# Patient Record
Sex: MALE | Race: ASIAN | NOT HISPANIC OR LATINO
[De-identification: names, ages, dates, MRNs, and addresses within clinical notes are randomized per-mention and may not be internally consistent; named-entity substitution may affect disease eponyms.]

---

## 2017-07-07 ENCOUNTER — APPOINTMENT (OUTPATIENT)
Dept: UROLOGY | Facility: CLINIC | Age: 57
End: 2017-07-07

## 2017-07-07 VITALS — WEIGHT: 170 LBS | HEIGHT: 62 IN | BODY MASS INDEX: 31.28 KG/M2

## 2017-07-07 DIAGNOSIS — F17.200 NICOTINE DEPENDENCE, UNSPECIFIED, UNCOMPLICATED: ICD-10-CM

## 2017-07-07 DIAGNOSIS — E11.9 TYPE 2 DIABETES MELLITUS W/OUT COMPLICATIONS: ICD-10-CM

## 2017-07-07 DIAGNOSIS — I10 ESSENTIAL (PRIMARY) HYPERTENSION: ICD-10-CM

## 2017-07-07 DIAGNOSIS — C61 MALIGNANT NEOPLASM OF PROSTATE: ICD-10-CM

## 2017-07-10 PROBLEM — F17.200 CURRENT SMOKER: Status: ACTIVE | Noted: 2017-07-07

## 2017-07-10 RX ORDER — IBUPROFEN 800 MG/1
800 TABLET, FILM COATED ORAL
Refills: 0 | Status: ACTIVE | COMMUNITY

## 2017-07-10 RX ORDER — FENOFIBRATE 200 MG/1
200 CAPSULE ORAL
Refills: 0 | Status: ACTIVE | COMMUNITY

## 2017-07-10 RX ORDER — HALOBETASOL PROPIONATE 0.5 MG/G
OINTMENT TOPICAL
Refills: 0 | Status: COMPLETED | COMMUNITY

## 2017-07-10 RX ORDER — ATORVASTATIN CALCIUM 10 MG/1
10 TABLET, FILM COATED ORAL
Refills: 0 | Status: ACTIVE | COMMUNITY

## 2017-07-10 RX ORDER — GLIMEPIRIDE 4 MG/1
4 TABLET ORAL
Refills: 0 | Status: ACTIVE | COMMUNITY

## 2017-07-10 RX ORDER — ENALAPRIL MALEATE 10 MG/1
10 TABLET ORAL
Refills: 0 | Status: ACTIVE | COMMUNITY

## 2017-07-10 RX ORDER — PANTOPRAZOLE SODIUM 40 MG/10ML
40 INJECTION, POWDER, FOR SOLUTION INTRAVENOUS
Refills: 0 | Status: ACTIVE | COMMUNITY

## 2017-07-10 RX ORDER — METFORMIN HYDROCHLORIDE 1000 MG/1
TABLET, FILM COATED ORAL
Refills: 0 | Status: ACTIVE | COMMUNITY

## 2019-12-08 ENCOUNTER — INPATIENT (INPATIENT)
Facility: HOSPITAL | Age: 59
LOS: 7 days | Discharge: HOME | End: 2019-12-16
Attending: HOSPITALIST | Admitting: HOSPITALIST
Payer: MEDICAID

## 2019-12-08 VITALS — OXYGEN SATURATION: 100 % | HEART RATE: 76 BPM

## 2019-12-08 DIAGNOSIS — R41.82 ALTERED MENTAL STATUS, UNSPECIFIED: ICD-10-CM

## 2019-12-08 LAB
ALBUMIN SERPL ELPH-MCNC: 4.4 G/DL — SIGNIFICANT CHANGE UP (ref 3.3–5)
ALP SERPL-CCNC: 84 U/L — SIGNIFICANT CHANGE UP (ref 40–120)
ALT FLD-CCNC: 51 U/L — HIGH (ref 4–41)
AMPHET UR-MCNC: NEGATIVE — SIGNIFICANT CHANGE UP
ANION GAP SERPL CALC-SCNC: 17 MMO/L — HIGH (ref 7–14)
APAP SERPL-MCNC: < 15 UG/ML — LOW (ref 15–25)
APPEARANCE UR: CLEAR — SIGNIFICANT CHANGE UP
APTT BLD: 29.6 SEC — SIGNIFICANT CHANGE UP (ref 27.5–36.3)
AST SERPL-CCNC: 32 U/L — SIGNIFICANT CHANGE UP (ref 4–40)
BACTERIA # UR AUTO: NEGATIVE — SIGNIFICANT CHANGE UP
BARBITURATES UR SCN-MCNC: NEGATIVE — SIGNIFICANT CHANGE UP
BASE EXCESS BLDA CALC-SCNC: -6.1 MMOL/L — SIGNIFICANT CHANGE UP
BASE EXCESS BLDV CALC-SCNC: -1.3 MMOL/L — SIGNIFICANT CHANGE UP
BASOPHILS # BLD AUTO: 0.06 K/UL — SIGNIFICANT CHANGE UP (ref 0–0.2)
BASOPHILS NFR BLD AUTO: 0.7 % — SIGNIFICANT CHANGE UP (ref 0–2)
BENZODIAZ UR-MCNC: NEGATIVE — SIGNIFICANT CHANGE UP
BILIRUB SERPL-MCNC: < 0.2 MG/DL — LOW (ref 0.2–1.2)
BILIRUB UR-MCNC: NEGATIVE — SIGNIFICANT CHANGE UP
BLOOD GAS ARTERIAL - FIO2: 50 — SIGNIFICANT CHANGE UP
BLOOD GAS VENOUS - CREATININE: 0.82 MG/DL — SIGNIFICANT CHANGE UP (ref 0.5–1.3)
BLOOD UR QL VISUAL: NEGATIVE — SIGNIFICANT CHANGE UP
BUN SERPL-MCNC: 12 MG/DL — SIGNIFICANT CHANGE UP (ref 7–23)
CALCIUM SERPL-MCNC: 8.9 MG/DL — SIGNIFICANT CHANGE UP (ref 8.4–10.5)
CANNABINOIDS UR-MCNC: NEGATIVE — SIGNIFICANT CHANGE UP
CHLORIDE BLDA-SCNC: 109 MMOL/L — HIGH (ref 96–108)
CHLORIDE BLDV-SCNC: 107 MMOL/L — SIGNIFICANT CHANGE UP (ref 96–108)
CHLORIDE SERPL-SCNC: 101 MMOL/L — SIGNIFICANT CHANGE UP (ref 98–107)
CO2 SERPL-SCNC: 21 MMOL/L — LOW (ref 22–31)
COCAINE METAB.OTHER UR-MCNC: NEGATIVE — SIGNIFICANT CHANGE UP
COLOR SPEC: SIGNIFICANT CHANGE UP
CREAT SERPL-MCNC: 0.77 MG/DL — SIGNIFICANT CHANGE UP (ref 0.5–1.3)
EOSINOPHIL # BLD AUTO: 0.27 K/UL — SIGNIFICANT CHANGE UP (ref 0–0.5)
EOSINOPHIL NFR BLD AUTO: 3.2 % — SIGNIFICANT CHANGE UP (ref 0–6)
ETHANOL BLD-MCNC: 246 MG/DL — HIGH
GAS PNL BLDV: 141 MMOL/L — SIGNIFICANT CHANGE UP (ref 136–146)
GLUCOSE BLDA-MCNC: 198 MG/DL — HIGH (ref 70–99)
GLUCOSE BLDV-MCNC: 86 MG/DL — SIGNIFICANT CHANGE UP (ref 70–99)
GLUCOSE SERPL-MCNC: 91 MG/DL — SIGNIFICANT CHANGE UP (ref 70–99)
GLUCOSE UR-MCNC: NEGATIVE — SIGNIFICANT CHANGE UP
HCO3 BLDA-SCNC: 19 MMOL/L — LOW (ref 22–26)
HCO3 BLDV-SCNC: 21 MMOL/L — SIGNIFICANT CHANGE UP (ref 20–27)
HCT VFR BLD CALC: 40.9 % — SIGNIFICANT CHANGE UP (ref 39–50)
HCT VFR BLDA CALC: 38.9 % — LOW (ref 39–51)
HCT VFR BLDV CALC: 40.7 % — SIGNIFICANT CHANGE UP (ref 39–51)
HGB BLD-MCNC: 13.1 G/DL — SIGNIFICANT CHANGE UP (ref 13–17)
HGB BLDA-MCNC: 12.6 G/DL — LOW (ref 13–17)
HGB BLDV-MCNC: 13.2 G/DL — SIGNIFICANT CHANGE UP (ref 13–17)
HYALINE CASTS # UR AUTO: NEGATIVE — SIGNIFICANT CHANGE UP
IMM GRANULOCYTES NFR BLD AUTO: 0.2 % — SIGNIFICANT CHANGE UP (ref 0–1.5)
INR BLD: 0.9 — SIGNIFICANT CHANGE UP (ref 0.88–1.17)
KETONES UR-MCNC: SIGNIFICANT CHANGE UP
LACTATE BLDA-SCNC: 5.1 MMOL/L — CRITICAL HIGH (ref 0.5–2)
LACTATE BLDV-MCNC: 3.9 MMOL/L — HIGH (ref 0.5–2)
LEUKOCYTE ESTERASE UR-ACNC: NEGATIVE — SIGNIFICANT CHANGE UP
LYMPHOCYTES # BLD AUTO: 4.5 K/UL — HIGH (ref 1–3.3)
LYMPHOCYTES # BLD AUTO: 53.4 % — HIGH (ref 13–44)
MCHC RBC-ENTMCNC: 26.4 PG — LOW (ref 27–34)
MCHC RBC-ENTMCNC: 32 % — SIGNIFICANT CHANGE UP (ref 32–36)
MCV RBC AUTO: 82.5 FL — SIGNIFICANT CHANGE UP (ref 80–100)
METHADONE UR-MCNC: NEGATIVE — SIGNIFICANT CHANGE UP
MONOCYTES # BLD AUTO: 0.77 K/UL — SIGNIFICANT CHANGE UP (ref 0–0.9)
MONOCYTES NFR BLD AUTO: 9.1 % — SIGNIFICANT CHANGE UP (ref 2–14)
NEUTROPHILS # BLD AUTO: 2.8 K/UL — SIGNIFICANT CHANGE UP (ref 1.8–7.4)
NEUTROPHILS NFR BLD AUTO: 33.4 % — LOW (ref 43–77)
NITRITE UR-MCNC: NEGATIVE — SIGNIFICANT CHANGE UP
NRBC # FLD: 0 K/UL — SIGNIFICANT CHANGE UP (ref 0–0)
OPIATES UR-MCNC: NEGATIVE — SIGNIFICANT CHANGE UP
OXYCODONE UR-MCNC: NEGATIVE — SIGNIFICANT CHANGE UP
PCO2 BLDA: 46 MMHG — SIGNIFICANT CHANGE UP (ref 35–48)
PCO2 BLDV: 49 MMHG — SIGNIFICANT CHANGE UP (ref 41–51)
PCP UR-MCNC: NEGATIVE — SIGNIFICANT CHANGE UP
PH BLDA: 7.26 PH — LOW (ref 7.35–7.45)
PH BLDV: 7.31 PH — LOW (ref 7.32–7.43)
PH UR: 6 — SIGNIFICANT CHANGE UP (ref 5–8)
PLATELET # BLD AUTO: 215 K/UL — SIGNIFICANT CHANGE UP (ref 150–400)
PMV BLD: 11 FL — SIGNIFICANT CHANGE UP (ref 7–13)
PO2 BLDA: 203 MMHG — HIGH (ref 83–108)
PO2 BLDV: 27 MMHG — LOW (ref 35–40)
POTASSIUM BLDA-SCNC: 3.4 MMOL/L — SIGNIFICANT CHANGE UP (ref 3.4–4.5)
POTASSIUM BLDV-SCNC: 3 MMOL/L — LOW (ref 3.4–4.5)
POTASSIUM SERPL-MCNC: 3.5 MMOL/L — SIGNIFICANT CHANGE UP (ref 3.5–5.3)
POTASSIUM SERPL-SCNC: 3.5 MMOL/L — SIGNIFICANT CHANGE UP (ref 3.5–5.3)
PROT SERPL-MCNC: 7.5 G/DL — SIGNIFICANT CHANGE UP (ref 6–8.3)
PROT UR-MCNC: 200 — HIGH
PROTHROM AB SERPL-ACNC: 10 SEC — SIGNIFICANT CHANGE UP (ref 9.8–13.1)
RBC # BLD: 4.96 M/UL — SIGNIFICANT CHANGE UP (ref 4.2–5.8)
RBC # FLD: 12.7 % — SIGNIFICANT CHANGE UP (ref 10.3–14.5)
RBC CASTS # UR COMP ASSIST: SIGNIFICANT CHANGE UP (ref 0–?)
SALICYLATES SERPL-MCNC: < 5 MG/DL — LOW (ref 15–30)
SAO2 % BLDA: 99.1 % — HIGH (ref 95–99)
SAO2 % BLDV: 38.2 % — LOW (ref 60–85)
SODIUM BLDA-SCNC: 138 MMOL/L — SIGNIFICANT CHANGE UP (ref 136–146)
SODIUM SERPL-SCNC: 139 MMOL/L — SIGNIFICANT CHANGE UP (ref 135–145)
SP GR SPEC: 1.02 — SIGNIFICANT CHANGE UP (ref 1–1.04)
SQUAMOUS # UR AUTO: SIGNIFICANT CHANGE UP
TROPONIN T, HIGH SENSITIVITY: 8 NG/L — SIGNIFICANT CHANGE UP (ref ?–14)
UROBILINOGEN FLD QL: NORMAL — SIGNIFICANT CHANGE UP
WBC # BLD: 8.42 K/UL — SIGNIFICANT CHANGE UP (ref 3.8–10.5)
WBC # FLD AUTO: 8.42 K/UL — SIGNIFICANT CHANGE UP (ref 3.8–10.5)
WBC UR QL: SIGNIFICANT CHANGE UP (ref 0–?)

## 2019-12-08 PROCEDURE — 70450 CT HEAD/BRAIN W/O DYE: CPT | Mod: 26

## 2019-12-08 PROCEDURE — 99291 CRITICAL CARE FIRST HOUR: CPT | Mod: 25

## 2019-12-08 PROCEDURE — 76604 US EXAM CHEST: CPT | Mod: 26,GC

## 2019-12-08 PROCEDURE — 36600 WITHDRAWAL OF ARTERIAL BLOOD: CPT

## 2019-12-08 PROCEDURE — 71045 X-RAY EXAM CHEST 1 VIEW: CPT | Mod: 26

## 2019-12-08 PROCEDURE — 76937 US GUIDE VASCULAR ACCESS: CPT | Mod: 26

## 2019-12-08 PROCEDURE — 93308 TTE F-UP OR LMTD: CPT | Mod: 26,GC

## 2019-12-08 RX ORDER — THIAMINE MONONITRATE (VIT B1) 100 MG
500 TABLET ORAL THREE TIMES A DAY
Refills: 0 | Status: DISCONTINUED | OUTPATIENT
Start: 2019-12-08 | End: 2019-12-08

## 2019-12-08 RX ORDER — SUCCINYLCHOLINE CHLORIDE 100 MG/5ML
100 SYRINGE (ML) INTRAVENOUS ONCE
Refills: 0 | Status: COMPLETED | OUTPATIENT
Start: 2019-12-08 | End: 2019-12-08

## 2019-12-08 RX ORDER — PROPOFOL 10 MG/ML
10 INJECTION, EMULSION INTRAVENOUS
Qty: 1000 | Refills: 0 | Status: DISCONTINUED | OUTPATIENT
Start: 2019-12-08 | End: 2019-12-09

## 2019-12-08 RX ORDER — CHLORHEXIDINE GLUCONATE 213 G/1000ML
15 SOLUTION TOPICAL EVERY 12 HOURS
Refills: 0 | Status: DISCONTINUED | OUTPATIENT
Start: 2019-12-08 | End: 2019-12-10

## 2019-12-08 RX ORDER — THIAMINE MONONITRATE (VIT B1) 100 MG
500 TABLET ORAL THREE TIMES A DAY
Refills: 0 | Status: COMPLETED | OUTPATIENT
Start: 2019-12-08 | End: 2019-12-11

## 2019-12-08 RX ORDER — NALOXONE HYDROCHLORIDE 4 MG/.1ML
0.4 SPRAY NASAL ONCE
Refills: 0 | Status: COMPLETED | OUTPATIENT
Start: 2019-12-08 | End: 2019-12-08

## 2019-12-08 RX ORDER — HEPARIN SODIUM 5000 [USP'U]/ML
5000 INJECTION INTRAVENOUS; SUBCUTANEOUS EVERY 8 HOURS
Refills: 0 | Status: DISCONTINUED | OUTPATIENT
Start: 2019-12-08 | End: 2019-12-16

## 2019-12-08 RX ORDER — CHLORHEXIDINE GLUCONATE 213 G/1000ML
1 SOLUTION TOPICAL
Refills: 0 | Status: DISCONTINUED | OUTPATIENT
Start: 2019-12-08 | End: 2019-12-10

## 2019-12-08 RX ORDER — ETOMIDATE 2 MG/ML
20 INJECTION INTRAVENOUS ONCE
Refills: 0 | Status: COMPLETED | OUTPATIENT
Start: 2019-12-08 | End: 2019-12-08

## 2019-12-08 RX ORDER — NOREPINEPHRINE BITARTRATE/D5W 8 MG/250ML
0.05 PLASTIC BAG, INJECTION (ML) INTRAVENOUS
Qty: 8 | Refills: 0 | Status: DISCONTINUED | OUTPATIENT
Start: 2019-12-08 | End: 2019-12-09

## 2019-12-08 RX ORDER — LEVETIRACETAM 250 MG/1
1000 TABLET, FILM COATED ORAL ONCE
Refills: 0 | Status: COMPLETED | OUTPATIENT
Start: 2019-12-08 | End: 2019-12-08

## 2019-12-08 RX ORDER — SODIUM CHLORIDE 9 MG/ML
500 INJECTION INTRAMUSCULAR; INTRAVENOUS; SUBCUTANEOUS ONCE
Refills: 0 | Status: COMPLETED | OUTPATIENT
Start: 2019-12-08 | End: 2019-12-09

## 2019-12-08 RX ORDER — MIDAZOLAM HYDROCHLORIDE 1 MG/ML
4 INJECTION, SOLUTION INTRAMUSCULAR; INTRAVENOUS ONCE
Refills: 0 | Status: DISCONTINUED | OUTPATIENT
Start: 2019-12-08 | End: 2019-12-08

## 2019-12-08 RX ORDER — SODIUM CHLORIDE 9 MG/ML
1000 INJECTION, SOLUTION INTRAVENOUS
Refills: 0 | Status: DISCONTINUED | OUTPATIENT
Start: 2019-12-08 | End: 2019-12-09

## 2019-12-08 RX ORDER — FENTANYL CITRATE 50 UG/ML
100 INJECTION INTRAVENOUS ONCE
Refills: 0 | Status: DISCONTINUED | OUTPATIENT
Start: 2019-12-08 | End: 2019-12-08

## 2019-12-08 RX ORDER — PANTOPRAZOLE SODIUM 20 MG/1
40 TABLET, DELAYED RELEASE ORAL
Refills: 0 | Status: DISCONTINUED | OUTPATIENT
Start: 2019-12-08 | End: 2019-12-09

## 2019-12-08 RX ORDER — MIDAZOLAM HYDROCHLORIDE 1 MG/ML
0.02 INJECTION, SOLUTION INTRAMUSCULAR; INTRAVENOUS
Qty: 100 | Refills: 0 | Status: DISCONTINUED | OUTPATIENT
Start: 2019-12-08 | End: 2019-12-09

## 2019-12-08 RX ADMIN — PROPOFOL 4.97 MICROGRAM(S)/KG/MIN: 10 INJECTION, EMULSION INTRAVENOUS at 22:37

## 2019-12-08 RX ADMIN — LEVETIRACETAM 400 MILLIGRAM(S): 250 TABLET, FILM COATED ORAL at 18:25

## 2019-12-08 RX ADMIN — MIDAZOLAM HYDROCHLORIDE 4 MILLIGRAM(S): 1 INJECTION, SOLUTION INTRAMUSCULAR; INTRAVENOUS at 21:34

## 2019-12-08 RX ADMIN — FENTANYL CITRATE 100 MICROGRAM(S): 50 INJECTION INTRAVENOUS at 18:00

## 2019-12-08 RX ADMIN — ETOMIDATE 20 MILLIGRAM(S): 2 INJECTION INTRAVENOUS at 17:53

## 2019-12-08 RX ADMIN — HEPARIN SODIUM 5000 UNIT(S): 5000 INJECTION INTRAVENOUS; SUBCUTANEOUS at 22:38

## 2019-12-08 RX ADMIN — Medication 100 MILLIGRAM(S): at 17:53

## 2019-12-08 RX ADMIN — NALOXONE HYDROCHLORIDE 0.4 MILLIGRAM(S): 4 SPRAY NASAL at 17:55

## 2019-12-08 RX ADMIN — PROPOFOL 4.97 MICROGRAM(S)/KG/MIN: 10 INJECTION, EMULSION INTRAVENOUS at 18:35

## 2019-12-08 RX ADMIN — SODIUM CHLORIDE 100 MILLILITER(S): 9 INJECTION, SOLUTION INTRAVENOUS at 21:35

## 2019-12-08 RX ADMIN — MIDAZOLAM HYDROCHLORIDE 1.66 MG/KG/HR: 1 INJECTION, SOLUTION INTRAMUSCULAR; INTRAVENOUS at 21:35

## 2019-12-08 RX ADMIN — Medication 7.24 MICROGRAM(S)/KG/MIN: at 22:37

## 2019-12-08 NOTE — ED ADULT TRIAGE NOTE - CHIEF COMPLAINT QUOTE
brought in by ems as notification for altered mental status. arrives unresponsive. brought directly to tr b.

## 2019-12-08 NOTE — ED PROVIDER NOTE - ATTENDING CONTRIBUTION TO CARE
I performed a face to face bedside interview with patient regarding history of present illness, review of symptoms and past medical history. I completed an independent physical exam.  I have discussed patient's plan of care.   I agree with note as stated above, having amended the EMR as needed to reflect my findings. I have discussed the assessment and plan of care.  This includes during the time I functioned as the attending physician for this patient.  Attending Contribution to Care: agree with plan of resident. pt with ams, plus vomiting. requiring intubation for air way protection. pt ct head neg for ICH. eventual labs who post for etoh. although family denies. pt vss, with no hyper/hypo tension. labs otherwise wnl with no signs of infection. concern for aspiration pna but no signs on cxr. intubation uncomplicated and peg placed uncomplicated. stable for icu admission at this point.

## 2019-12-08 NOTE — H&P ADULT - NSHPLABSRESULTS_GEN_ALL_CORE
OBJECTIVE:  ICU Vital Signs Last 24 Hrs  T(C): --  T(F): --  HR: 74 (08 Dec 2019 19:16) (70 - 76)  BP: 112/69 (08 Dec 2019 19:00) (112/69 - 125/75)  BP(mean): 79 (08 Dec 2019 19:00) (78 - 79)  ABP: --  ABP(mean): --  RR: 17 (08 Dec 2019 19:00) (16 - 17)  SpO2: 100% (08 Dec 2019 19:16) (100% - 100%)    Mode: AC/ CMV (Assist Control/ Continuous Mandatory Ventilation), RR (machine): 16, TV (machine): 350, FiO2: 50, PEEP: 5, MAP: 10, PIP: 22    CAPILLARY BLOOD GLUCOSE    LABS:                        13.1   8.42  )-----------( 215      ( 08 Dec 2019 17:45 )             40.9         139  |  101  |  12  ----------------------------<  91  3.5   |  21<L>  |  0.77    Ca    8.9      08 Dec 2019 17:45    TPro  7.5  /  Alb  4.4  /  TBili  < 0.2<L>  /  DBili  x   /  AST  32  /  ALT  51<H>  /  AlkPhos  84  1208    PT/INR - ( 08 Dec 2019 17:45 )   PT: 10.0 SEC;   INR: 0.90          PTT - ( 08 Dec 2019 17:45 )  PTT:29.6 SEC  Urinalysis Basic - ( 08 Dec 2019 18:51 )    Color: LIGHT YELLOW / Appearance: CLEAR / S.016 / pH: 6.0  Gluc: NEGATIVE / Ketone: SMALL  / Bili: NEGATIVE / Urobili: NORMAL   Blood: NEGATIVE / Protein: 200 / Nitrite: NEGATIVE   Leuk Esterase: NEGATIVE / RBC: 3-5 / WBC 0-2   Sq Epi: OCC / Non Sq Epi: x / Bacteria: NEGATIVE        Venous Blood Gas:   @ 17:45  7.31/49/27/21/38.2  VBG Lactate: 3.9      MICROBIOLOGY:     RADIOLOGY:  < from: CT Brain Stroke Protocol (19 @ 17:56) >      EXAM:  CT BRAIN STROKE PROTOCOL        PROCEDURE DATE:  Dec  8 2019         INTERPRETATION:  HISTORY: Unresponsive    COMPARISON: None    TECHNIQUE: Axial noncontrast CT images from the skull base to the vertex   were obtained and submitted for interpretation. Coronal and sagittal   reformatted images were performed. Bone and soft tissue windows were   evaluated.    FINDINGS: There is no acute intracranial mass-effect, hemorrhage, midline   shift, or abnormal extra-axial fluid collection.   Basal cisterns are patent.     The ventricles, and sulci are normal in size for the patient's age.     Paranasal sinuses and mastoid air cells are clear. Calvarium is intact.     IMPRESSION: No acute intracranial hemorrhage, mass effect, or shift of   the midline structures.     < end of copied text >        [ ] Reviewed and interpreted by me    EKG:

## 2019-12-08 NOTE — H&P ADULT - NSHPPHYSICALEXAM_GEN_ALL_CORE
PHYSICAL EXAM:  General:   HEENT:   Lymph Nodes:  Neck:   Respiratory:   Cardiovascular:   Abdomen:   Extremities:   Skin:   Neurological:  Psychiatry: PHYSICAL EXAM:  General: patient intubated, not disheveled   HEENT: pupils reactive to light, no scleral icterus  Lymph Nodes: no tender lymph notes  Neck: supple  Respiratory: lungs clear to auscultation, non labored breathing  Cardiovascular: normal s1, s2, no murmurs, gallops  Abdomen: soft, non-tender, non-distended, bowel sounds normoactive  Extremities: no lower extremity swelling  Skin: no rashes  Neurological: A&Ox0, on propfol

## 2019-12-08 NOTE — H&P ADULT - ATTENDING COMMENTS
60 yo with DM and HTN presenting after being found unresponsive and having poss seizure activity.  In ED he was found to have blood alcohol level of 246 and had witnessed seizure.  CT head negative,  Was intubated for airway protection after vomiting. Pt loaded with Keppra in ED; sedated on vent with prop and midazolam    POCUS shows large liver, nl LVF, RV<LV size, no pericardial effusion, lungs are A line predom with poss atalectasis vs small consolidations at bilat bases; no pleff.  Kidneys no hydro but L kidney has two large cysts  Afeb  BP soft   WBC 8  Plt 215  INR .9  creat .77  LFT wnl (ALT 51)    Acute EtOH intoxication and seizure activity  Poss aspiration event; intubated for airway protection  - continue sedation; may lighten during vEEG  - vEEG in am  - hold ABX and monitor  - MVI, thiamine, folate  - consider MRI  - check ammonia level and hep screen  - neuro note appreciated    guarded  cc time 40 min excluding time spent on POCUS

## 2019-12-08 NOTE — CONSULT NOTE ADULT - ASSESSMENT
59Y M with PMH DM, HTN, HLD who presents with acute onset of unresponsiveness. CT Head negative for acute pathology. Exam significant for + movement in all extremities although no withdrawl/grimace to noxious stimuli, Left upwards gaze deviated and rigid RUE. Limited exam due to sedation.    Impression:  Status epilepticus likely secondary to alcohol withdrawl     Recommend:  - 59Y M with PMH DM, HTN, HLD who presents with acute onset of unresponsiveness. CT Head negative for acute pathology. Exam significant for + movement in all extremities although no withdrawl/grimace to noxious stimuli, Left upwards gaze deviated and rigid RUE. Limited exam due to sedation. S/p 1g Keppra load in ED. Currently sedated on propofol     Impression:  NCSE likely secondary to severe alcohol intoxication     Recommend:  - pursue metabolic/infectious workup to rule out contributing factors for seizures  - vEEG  - send ammonia level  - MRI brain w/ and w/o to evaluate for structural abnormality that can pre-dispose to seizure susceptibility   - when titrating off propofol for extubation, can use Ativan 2mg PRN as needed for seizure activity

## 2019-12-08 NOTE — CHART NOTE - NSCHARTNOTEFT_GEN_A_CORE
Critically ill patient requiring ABG to determine respiratory status.  This was an emergent procedure.  Patients radial artery was palpated/ visualized with US and cleaned with alcohol pad.   23g x 3/4" x 12" butterfly needed was inserted into the left/right radial artery with pulsatile flash.  One attempt was performed.  3cc of blood was obtained from patient.  Gauze pad was placed over site, needle withdrawn and firm pressure was held until complete hemostasis was achieved and band-aid was applied.  Patient tolerated procedure well with no complications.        [facility] RPA [id number]

## 2019-12-08 NOTE — H&P ADULT - HISTORY OF PRESENT ILLNESS
CHIEF COMPLAINT: AMS    HPI  Patient is a 59Y M with PMH DM, HTN, HLD who presents with acute onset of altered mental status. Per niece at bedside, she was upstairs and heard a thump (unclear if patient fell or if he dropped something). Niece heard her grandmother, who was in the kitchen at the time, call out for her so she went to evaluate her uncle who was in the middle of eating dinner. Patient told his niece that he was not hungry anymore and he did not feel so good. He stated he was seeing something at the top of his eyes and then slumped over at the table. Was not arousable per family, though niece states he was squeezing her hand en route in EMS. Code stroke called on arrival, initial NIHSS: 28, pre-MRS: unknown.     CT Head negative for hemorrhage or acute infarct. While pending CTA, patient vomited and was subsequently intubated for airway protection. He was then noted to be moving all extremities and had a left upwards gaze deviation. Shortly after, was noticed to have adducted, contracted and rigid RUE as well as episode of urinary incontinence. No prior hx of seizures. In the ED, patient's tylenol and salicyclate level was wnl, Ethanol level was elevated at 246. electrolytes are wnl. Started on propofol for sedation.    Niece endorses hx of focal seizures but is unsure of rest of family hx. Patients mother unable to be reached at time of exam.        FAMILY HISTORY:      SOCIAL HISTORY:  Smoking: __ packs x ___ years  EtOH Use:  Marital Status:  Occupation:  Recent Travel:  Country of Birth:  Advance Directives:    Allergies    No Known Allergies    Intolerances        HOME MEDICATIONS:    HOSPITAL MEDICATIONS:  MEDICATIONS  (STANDING):  chlorhexidine 0.12% Liquid 15 milliLiter(s) Oral Mucosa every 12 hours  propofol Infusion 10 MICROgram(s)/kG/Min (4.968 mL/Hr) IV Continuous <Continuous>    MEDICATIONS  (PRN): CHIEF COMPLAINT: AMS    HPI  Patient is a 59Y M with PMH DM, HTN, HLD who presents with acute onset of altered mental status. Per niece at bedside, she was upstairs and heard a thump (unclear if patient fell or if he dropped something). Niece heard her grandmother, who was in the kitchen at the time, call out for her so she went to evaluate her uncle who was in the middle of eating dinner. Patient told his niece that he was not hungry anymore and he did not feel so good. He stated he was seeing something at the top of his eyes and then slumped over at the table. Was not arousable per family, though niece states he was squeezing her hand en route in EMS. Code stroke called on arrival.    CT Head negative for hemorrhage or acute infarct. While pending CTA, patient vomited and was subsequently intubated for airway protection. He was then noted to be moving all extremities and had a left upwards gaze deviation. Shortly after, was noticed to have adducted, contracted and rigid RUE as well as episode of urinary incontinence. No prior hx of seizures. In the ED, patient's tylenol and salicyclate level was wnl, Ethanol level was elevated at 246. electrolytes are wnl. Started on propofol for sedation.    Niece endorses hx of focal seizures but is unsure of rest of family hx. Patients mother unable to be reached at time of exam.        FAMILY HISTORY:    Allergies    No Known Allergies    Intolerances    HOME MEDICATIONS:    HOSPITAL MEDICATIONS:  MEDICATIONS  (STANDING):  chlorhexidine 0.12% Liquid 15 milliLiter(s) Oral Mucosa every 12 hours  propofol Infusion 10 MICROgram(s)/kG/Min (4.968 mL/Hr) IV Continuous <Continuous>    MEDICATIONS  (PRN):

## 2019-12-08 NOTE — H&P ADULT - NSHPSOCIALHISTORY_GEN_ALL_CORE
SOCIAL HISTORY:  Smoking: <1 PPY x >40 yearsyears  EtOH Use: "occassional" alcohol use, never has had any prior intubations, intoxication episodes  Marital Status: no wife  Occupation:  Recent Travel: none  Advance Directives: full code

## 2019-12-08 NOTE — ED ADULT NURSE NOTE - OBJECTIVE STATEMENT
received to tr B as notification for AMS. as per family pt was eating, then said he saw a light and then put his head down and became unresponsive. pt was unresponsive on ems arrival. on arrival to ed was responsive to verbal stimuli. code stroke called. brought directly to ct scan. had episode of vomiting at ct scan. brought back to room B. intubated by MD. 7.5 et tube placed, 25@lip line. pt noted to have urinated on self. started on propofol as per md order. endorsed to WILLIE Castro.

## 2019-12-08 NOTE — PROCEDURE NOTE - NSPROCDETAILS_GEN_ALL_CORE
blood seen on insertion/sterile technique, catheter placed/ultrasound utilization/dressing applied/flushes easily/secured in place/location identified, draped/prepped, sterile technique used

## 2019-12-08 NOTE — H&P ADULT - ASSESSMENT
59Y M with PMH DM, HTN, HLD who presents with acute onset of unresponsiveness. CT Head negative for acute pathology, likely has Status epilepticus likely secondary to alcohol withdrawal    #Neuro  Status epilepticus 2/2 to etoh withdrawal  -ethanol level in blood 246  -start Ativan taper    #Resp  Intubated for airway protection  -f/u post extubation ABG  -afebrile, without a white count  -f/u Xray to assess for aspiration PNA, and consider starting antibiotics     #CV  HTN      #GI  -NPO for aspiration risk    #ID  -afebrile and WBC 8.42  -UA negative for leuk esterase and nitrates  -f/u CXR  -no need for abx now    #Renal  -Creatinine 0.77 (no baseline)  -Has rossi because patient was incontinent during the episode  -monitor Is and Os    #Heme  -Hemoglobin 13.1, stable  -continue to monitor    #Endo  DM  -HbA1c in AM    #DVT PPx  -Heparin 5000 TID 59Y M with PMH DM, HTN, HLD who presents with acute onset of unresponsiveness. CT Head negative for acute pathology, likely has Status epilepticus likely secondary to alcohol intoxication    #Neuro  Alcoholic intoxication vs new onset seizures  -ethanol level in blood 246  -start banana bag   -taper propofol and get EEG for seizures  -f/u neuro recs  -CT head negative     #Resp  Intubated for airway protection  -f/u post extubation ABG  -afebrile, without a white count  -f/u Xray    #CV  HTN  -on home metoprolol (med rec to be done in the AM)  -MAP <65 when propofol at 40, MAP> 65 with lower propofol levels.   -weaning propfol as tolerated    #GI  -NPO for now  -protonix 40 IV BID    #ID  -afebrile and WBC 8.42  -UA negative for leuk esterase and nitrates  -f/u CXR  -no need for abx now    #Renal  -Creatinine 0.77 (no baseline)  -Has rossi because patient was incontinent during the episode  -monitor Is and Os    #Heme  -Hemoglobin 13.1, stable  -continue to monitor  -f/u FOBT    #Endo  DM  -only on metformin  -HbA1c in AM    #DVT PPx  -Heparin 5000 TID 59Y M with PMH DM, HTN, HLD who presents with acute onset of unresponsiveness. CT Head negative for acute pathology, likely has Status epilepticus likely secondary to alcohol intoxication    #Neuro  Alcoholic intoxication vs new onset seizures  -ethanol level in blood 246, utox negative   -start banana bag   -taper propofol and get 24 hour EEG for seizures  -f/u neuro recs  -CT head negative     #Resp  Intubated for airway protection  -f/u post extubation ABG  -afebrile, without a white count  -f/u Xray    #CV  HTN  -on home metoprolol (med rec to be done in the AM)  -MAP <65 when propofol at 40, MAP> 65 with lower propofol levels.   -weaning propfol as tolerated    #GI  -NPO for now  -protonix 40 IV BID    #ID  -afebrile and WBC 8.42  -UA negative for leuk esterase and nitrates  -f/u CXR  -no need for abx now    #Renal  -Creatinine 0.77 (no baseline)  -Has rossi because patient was incontinent during the episode  -monitor Is and Os    #Heme  -Hemoglobin 13.1, stable  -continue to monitor  -f/u FOBT    #Endo  DM  -only on metformin  -HbA1c in AM    #DVT PPx  -Heparin 5000 TID 59Y M with PMH DM, HTN, HLD who presents with acute onset of unresponsiveness. CT Head negative for acute pathology, likely has Status epilepticus likely secondary to alcohol intoxication    #Neuro  Alcoholic intoxication vs new onset seizures  -ethanol level in blood 246, utox negative   -start banana bag   -taper propofol and get 24 hour EEG for seizures  -f/u neuro recs  -CT head negative     #Resp  Intubated for airway protection  -f/u post extubation ABG  -afebrile, without a white count  -f/u Xray    #CV  HTN  -on home metoprolol (med rec to be done in the AM)  -MAP <65 when propofol at 40, MAP> 65 with lower propofol levels.   -weaning propfol as tolerated    #GI  -NPO for now  -protonix 40 IV BID    #ID  -afebrile and WBC 8.42  -UA negative for leuk esterase and nitrates  -f/u CXR  -no need for abx now  - low threshold to start abx     #Renal  -Creatinine 0.77 (no baseline)  -Has rossi because patient was incontinent during the episode  -monitor Is and Os    #Heme  -Hemoglobin 13.1, stable  -continue to monitor  -f/u FOBT    #Endo  DM  -only on metformin  -HbA1c in AM    #DVT PPx  -Heparin 5000 TID 59Y M with PMH DM, HTN, HLD who presents with acute onset of unresponsiveness. CT Head negative for acute pathology, likely has Status epilepticus likely secondary to alcohol intoxication    #Neuro  Alcoholic intoxication vs new onset seizures  -ethanol level in blood 246, utox negative   -start banana bag   -taper propofol and get 24 hour EEG for seizures  -f/u neuro recs  -CT head negative   - F/u MRI brain to r/o structural abnormality    #Resp  Intubated for airway protection  -f/u post extubation ABG  -afebrile, without a white count  -f/u Xray    #CV  HTN  -on home metoprolol (med rec to be done in the AM)  -MAP <65 when propofol at 40, MAP> 65 with lower propofol levels.   -weaning propfol as tolerated    #GI  -NPO for now  -protonix 40 IV BID    #ID  -afebrile and WBC 8.42  -UA negative for leuk esterase and nitrates  -f/u CXR  -no need for abx now  - low threshold to start abx     #Renal  -Creatinine 0.77 (no baseline)  -Has rossi because patient was incontinent during the episode  -monitor Is and Os    #Heme  -Hemoglobin 13.1, stable  -continue to monitor  -f/u FOBT    #Endo  DM  -only on metformin  -HbA1c in AM    #DVT PPx  -Heparin 5000 TID

## 2019-12-08 NOTE — ED PROVIDER NOTE - CLINICAL SUMMARY MEDICAL DECISION MAKING FREE TEXT BOX
59M presenting w altered mental status. Unknown etiology, no reported prior sx. Obtunded, intubated for airway support. Concern for seizure vs intracranial pathology vs metabolic vs other. Labs, CT head, CXR, neuro consult.   Alvaro English MD, PGY3 Emergency Medicine

## 2019-12-08 NOTE — ED PROVIDER NOTE - CRITICAL CARE PROVIDED
interpretation of diagnostic studies/consultation with other physicians/documentation/conducted a detailed discussion of DNR status/additional history taking/direct patient care (not related to procedure)

## 2019-12-08 NOTE — H&P ADULT - NSHPREVIEWOFSYSTEMS_GEN_ALL_CORE
REVIEW OF SYSTEMS:  Constitutional:   Eyes:  ENT:  CV:  Resp:  GI:  :  MSK:  Integumentary:  Neurological:  Psychiatric:  Endocrine:  Hematologic/Lymphatic:  Allergic/Immunologic:  [ ] All other systems negative  [ ] Unable to assess ROS because ________ REVIEW OF SYSTEMS:  Constitutional:   Eyes:  ENT:  CV:  Resp:  GI:  :  MSK:  Integumentary:  Neurological:  Psychiatric:  Endocrine:  Hematologic/Lymphatic:  Allergic/Immunologic:  [ ] All other systems negative  [x] Unable to assess ROS because patient sedated on propofol and intubated

## 2019-12-08 NOTE — ED PROCEDURE NOTE - ATTENDING CONTRIBUTION TO CARE
Directly supervised procedure with no complications. Pt tolerated well.
Directly supervised procedure with no complications. Pt tolerated well.

## 2019-12-08 NOTE — ED PROVIDER NOTE - OBJECTIVE STATEMENT
59M presents via EMS with AMS. per family, patient was eating dinner when he became unresponsive and slumped     Hx HTN, HLD. Meds and allergies unknown. 59M presents via EMS with AMS. per family, patient was eating dinner when he became unresponsive and slumped forward onto his food. No prior reported hx of seizures. Further ROS unknown at this time. On arrival, patient altered, not following commands. Patient straight to CT for stroke code, vomited in CT, was brought to trauma B and intubated for airway support.   Hx HTN, HLD. Meds and allergies unknown.

## 2019-12-08 NOTE — ED PROVIDER NOTE - PHYSICAL EXAMINATION
General: obtunded, not awake, alert, or oriented.   HEENT: PERRLA. No trauma/bruising noted to head or face.  CV: Regular rate and rhythm, S1/S2, no murmurs/rubs/gallops noted on exam.   Lungs: Clear to ascultation bilaterally, no wheezes/crackles/rales noted on exam. Equal chest wall excursion noted.   Abdomen: Soft, non tender, non distended, no guarding or rebound.   MSK: Grossly intact ROM of upper and lower extremities bilaterally. Grossly intact ROM of neck. No gross deformities noted to extremities.   Neuro: AMS. Obtunded, not awake, alert, or oriented. Not responsive to verbal or tactile stimuli.   Extremities: No swelling or edema noted to extremities.   Skin: No rash or bruising noted on exam.

## 2019-12-08 NOTE — CONSULT NOTE ADULT - ATTENDING COMMENTS
Patient seen and examined with neurology resident and above note reviewed and I agree with assessment and plan as outlined. Patient admitted after an unresponsive episode and found being slumped over by family at home. No seizure activity noted and he was found to have elevated ETOH in blood.  Eyes closed but all brain stem reflexes in tact. He does not follow simple commands and reflexes in tact in arms and legs.  He is intubated however plan is to extubate today and obtain MRI brain and routine EEG.  Ammonia level pending.  MICU management and supportive care and behavioral health evaluation.

## 2019-12-08 NOTE — CONSULT NOTE ADULT - SUBJECTIVE AND OBJECTIVE BOX
HPI:  Patient is a 59Y M with PMH DM, HTN, HLD who presents with acute onset of altered mental status. Per niece at bedside, she was upstairs and heard a thump (unclear if patient fell or if he dropped something). Niece heard her grandmother, who was in the kitchen at the time, call out for her so she went to evaluate her uncle who was in the middle of eating dinner. Patient told his niece that he was not hungry anymore and he did not feel so good. He stated he was seeing something at the top of his eyes and then slumped over at the table. Was not arousable per family, though niece states he was squeezing her hand en route in EMS. Code stroke called on arrival, initial NIHSS: 28, pre-MRS: unknown. CT Head negative for hemorrhage or acute infarct. While pending CTA, patient vomited and was subsequently intubated for airway protection. He was then noted to be moving all extremities and had a left upwards gaze deviation. Shortly after, was noticed to have adducted, contracted and rigid RUE as well as episode of urinary incontinence. No convulsions noted on my exam or by family. No prior hx of seizures.   Niece endorses hx of focal seizures but is unsure of rest of family hx. Patients mother unable to be reached at time of exam.          MEDICATIONS  (STANDING):  chlorhexidine 0.12% Liquid 15 milliLiter(s) Oral Mucosa every 12 hours  propofol Infusion 10 MICROgram(s)/kG/Min (4.968 mL/Hr) IV Continuous <Continuous>    MEDICATIONS  (PRN):    PAST MEDICAL & SURGICAL HISTORY:  Osteoarthritis  Diabetes mellitus    FAMILY HISTORY:    Allergies    No Known Allergies    Intolerances        SHx - No smoking, No ETOH, No drug abuse    Review of Systems:  A 10 system ROS was performed and is negative except for those mentioned in HPI      Vital Signs Last 24 Hrs  T(C): --  T(F): --  HR: 74 (08 Dec 2019 18:28) (74 - 76)  BP: 125/75 (08 Dec 2019 18:28) (125/75 - 125/75)  BP(mean): --  RR: 16 (08 Dec 2019 18:28) (16 - 16)  SpO2: 100% (08 Dec 2019 18:28) (100% - 100%)      Neurological (>12): Limited exam prior to intubation, rest of exam obtained after intubated and sedated    MS: Eyes closed, does not respond to verbal or noxious stimuli.    CNs: PERRLA (R = 4mm, L = 4mm). BTT bilaterally, Left and upwards gaze deviation.     Motor: Normal muscle bulk & tone. Rigid RUE, no tremors  Moves all extremities spontaneously, no formal testing due to mental status    Sensation: does not withdraw or grimace to noxious stimuli     Reflexes:              Biceps(C5)       BR(C6)     Triceps(C7)               Patellar(L4)    Achilles(S1)    Plantar Resp  R	2	          2	             2		        2		    2		Down   L	2	          2	             2		        2		    2		Down       12-08    139  |  101  |  12  ----------------------------<  91  3.5   |  21<L>  |  0.77    Ca    8.9      08 Dec 2019 17:45    TPro  7.5  /  Alb  4.4  /  TBili  < 0.2<L>  /  DBili  x   /  AST  32  /  ALT  51<H>  /  AlkPhos  84  12-08 12-08    139  |  101  |  12  ----------------------------<  91  3.5   |  21<L>  |  0.77    Ca    8.9      08 Dec 2019 17:45    TPro  7.5  /  Alb  4.4  /  TBili  < 0.2<L>  /  DBili  x   /  AST  32  /  ALT  51<H>  /  AlkPhos  84  12-08                          13.1   8.42  )-----------( 215      ( 08 Dec 2019 17:45 )             40.9       Radiology

## 2019-12-09 LAB
ALBUMIN SERPL ELPH-MCNC: 3.5 G/DL — SIGNIFICANT CHANGE UP (ref 3.3–5)
ALP SERPL-CCNC: 73 U/L — SIGNIFICANT CHANGE UP (ref 40–120)
ALT FLD-CCNC: 48 U/L — HIGH (ref 4–41)
ANION GAP SERPL CALC-SCNC: 18 MMO/L — HIGH (ref 7–14)
APAP SERPL-MCNC: < 15 UG/ML — LOW (ref 15–25)
AST SERPL-CCNC: 41 U/L — HIGH (ref 4–40)
B-OH-BUTYR SERPL-SCNC: 0.3 MMOL/L — SIGNIFICANT CHANGE UP (ref 0–0.4)
BASE EXCESS BLDA CALC-SCNC: -4.5 MMOL/L — SIGNIFICANT CHANGE UP
BASE EXCESS BLDV CALC-SCNC: -5.8 MMOL/L — SIGNIFICANT CHANGE UP
BASOPHILS # BLD AUTO: 0.05 K/UL — SIGNIFICANT CHANGE UP (ref 0–0.2)
BASOPHILS NFR BLD AUTO: 0.6 % — SIGNIFICANT CHANGE UP (ref 0–2)
BILIRUB SERPL-MCNC: < 0.2 MG/DL — LOW (ref 0.2–1.2)
BLOOD GAS ARTERIAL - FIO2: 40 — SIGNIFICANT CHANGE UP
BLOOD GAS VENOUS - CREATININE: 0.73 MG/DL — SIGNIFICANT CHANGE UP (ref 0.5–1.3)
BLOOD GAS VENOUS - FIO2: 40 — SIGNIFICANT CHANGE UP
BUN SERPL-MCNC: 12 MG/DL — SIGNIFICANT CHANGE UP (ref 7–23)
CALCIUM SERPL-MCNC: 7.9 MG/DL — LOW (ref 8.4–10.5)
CHLORIDE BLDA-SCNC: 111 MMOL/L — HIGH (ref 96–108)
CHLORIDE BLDV-SCNC: 111 MMOL/L — HIGH (ref 96–108)
CHLORIDE SERPL-SCNC: 107 MMOL/L — SIGNIFICANT CHANGE UP (ref 98–107)
CK MB BLD-MCNC: 1.3 — SIGNIFICANT CHANGE UP (ref 0–2.5)
CK MB BLD-MCNC: 2.81 NG/ML — SIGNIFICANT CHANGE UP (ref 1–6.6)
CK SERPL-CCNC: 217 U/L — HIGH (ref 30–200)
CO2 SERPL-SCNC: 16 MMOL/L — LOW (ref 22–31)
CREAT SERPL-MCNC: 0.68 MG/DL — SIGNIFICANT CHANGE UP (ref 0.5–1.3)
EOSINOPHIL # BLD AUTO: 0.17 K/UL — SIGNIFICANT CHANGE UP (ref 0–0.5)
EOSINOPHIL NFR BLD AUTO: 1.9 % — SIGNIFICANT CHANGE UP (ref 0–6)
ETHANOL BLD-MCNC: 98 MG/DL — HIGH
GAS PNL BLDV: 139 MMOL/L — SIGNIFICANT CHANGE UP (ref 136–146)
GLUCOSE BLDA-MCNC: 182 MG/DL — HIGH (ref 70–99)
GLUCOSE BLDV-MCNC: 183 MG/DL — HIGH (ref 70–99)
GLUCOSE SERPL-MCNC: 196 MG/DL — HIGH (ref 70–99)
HBA1C BLD-MCNC: 10.6 % — HIGH (ref 4–5.6)
HCO3 BLDA-SCNC: 21 MMOL/L — LOW (ref 22–26)
HCO3 BLDV-SCNC: 19 MMOL/L — LOW (ref 20–27)
HCT VFR BLD CALC: 40 % — SIGNIFICANT CHANGE UP (ref 39–50)
HCT VFR BLDA CALC: 39.5 % — SIGNIFICANT CHANGE UP (ref 39–51)
HCT VFR BLDV CALC: 39.5 % — SIGNIFICANT CHANGE UP (ref 39–51)
HGB BLD-MCNC: 12.7 G/DL — LOW (ref 13–17)
HGB BLDA-MCNC: 12.9 G/DL — LOW (ref 13–17)
HGB BLDV-MCNC: 12.9 G/DL — LOW (ref 13–17)
IMM GRANULOCYTES NFR BLD AUTO: 0.2 % — SIGNIFICANT CHANGE UP (ref 0–1.5)
LACTATE BLDA-SCNC: 4.8 MMOL/L — CRITICAL HIGH (ref 0.5–2)
LACTATE BLDV-MCNC: 5.3 MMOL/L — CRITICAL HIGH (ref 0.5–2)
LYMPHOCYTES # BLD AUTO: 3.93 K/UL — HIGH (ref 1–3.3)
LYMPHOCYTES # BLD AUTO: 44.6 % — HIGH (ref 13–44)
MAGNESIUM SERPL-MCNC: 1.6 MG/DL — SIGNIFICANT CHANGE UP (ref 1.6–2.6)
MCHC RBC-ENTMCNC: 26.5 PG — LOW (ref 27–34)
MCHC RBC-ENTMCNC: 31.8 % — LOW (ref 32–36)
MCV RBC AUTO: 83.5 FL — SIGNIFICANT CHANGE UP (ref 80–100)
MONOCYTES # BLD AUTO: 0.51 K/UL — SIGNIFICANT CHANGE UP (ref 0–0.9)
MONOCYTES NFR BLD AUTO: 5.8 % — SIGNIFICANT CHANGE UP (ref 2–14)
NEUTROPHILS # BLD AUTO: 4.14 K/UL — SIGNIFICANT CHANGE UP (ref 1.8–7.4)
NEUTROPHILS NFR BLD AUTO: 46.9 % — SIGNIFICANT CHANGE UP (ref 43–77)
NRBC # FLD: 0 K/UL — SIGNIFICANT CHANGE UP (ref 0–0)
OSMOLALITY SERPL: 322 MOSMO/KG — HIGH (ref 275–295)
PCO2 BLDA: 42 MMHG — SIGNIFICANT CHANGE UP (ref 35–48)
PCO2 BLDV: 41 MMHG — SIGNIFICANT CHANGE UP (ref 41–51)
PH BLDA: 7.31 PH — LOW (ref 7.35–7.45)
PH BLDV: 7.3 PH — LOW (ref 7.32–7.43)
PHOSPHATE SERPL-MCNC: 3.5 MG/DL — SIGNIFICANT CHANGE UP (ref 2.5–4.5)
PLATELET # BLD AUTO: 234 K/UL — SIGNIFICANT CHANGE UP (ref 150–400)
PMV BLD: 11.1 FL — SIGNIFICANT CHANGE UP (ref 7–13)
PO2 BLDA: 137 MMHG — HIGH (ref 83–108)
PO2 BLDV: 104 MMHG — HIGH (ref 35–40)
POTASSIUM BLDA-SCNC: 3.3 MMOL/L — LOW (ref 3.4–4.5)
POTASSIUM BLDV-SCNC: 3.4 MMOL/L — SIGNIFICANT CHANGE UP (ref 3.4–4.5)
POTASSIUM SERPL-MCNC: 3.7 MMOL/L — SIGNIFICANT CHANGE UP (ref 3.5–5.3)
POTASSIUM SERPL-SCNC: 3.7 MMOL/L — SIGNIFICANT CHANGE UP (ref 3.5–5.3)
PROT SERPL-MCNC: 6.4 G/DL — SIGNIFICANT CHANGE UP (ref 6–8.3)
RBC # BLD: 4.79 M/UL — SIGNIFICANT CHANGE UP (ref 4.2–5.8)
RBC # FLD: 13 % — SIGNIFICANT CHANGE UP (ref 10.3–14.5)
SALICYLATES SERPL-MCNC: < 5 MG/DL — LOW (ref 15–30)
SAO2 % BLDA: 98.8 % — SIGNIFICANT CHANGE UP (ref 95–99)
SAO2 % BLDV: 97.3 % — HIGH (ref 60–85)
SODIUM BLDA-SCNC: 140 MMOL/L — SIGNIFICANT CHANGE UP (ref 136–146)
SODIUM SERPL-SCNC: 141 MMOL/L — SIGNIFICANT CHANGE UP (ref 135–145)
SPECIMEN SOURCE: SIGNIFICANT CHANGE UP
WBC # BLD: 8.82 K/UL — SIGNIFICANT CHANGE UP (ref 3.8–10.5)
WBC # FLD AUTO: 8.82 K/UL — SIGNIFICANT CHANGE UP (ref 3.8–10.5)

## 2019-12-09 PROCEDURE — 99291 CRITICAL CARE FIRST HOUR: CPT | Mod: 25

## 2019-12-09 PROCEDURE — 99223 1ST HOSP IP/OBS HIGH 75: CPT | Mod: GC

## 2019-12-09 RX ORDER — DEXTROSE 50 % IN WATER 50 %
25 SYRINGE (ML) INTRAVENOUS ONCE
Refills: 0 | Status: DISCONTINUED | OUTPATIENT
Start: 2019-12-09 | End: 2019-12-16

## 2019-12-09 RX ORDER — PHENOBARBITAL 60 MG
130 TABLET ORAL
Refills: 0 | Status: DISCONTINUED | OUTPATIENT
Start: 2019-12-09 | End: 2019-12-10

## 2019-12-09 RX ORDER — GLUCAGON INJECTION, SOLUTION 0.5 MG/.1ML
1 INJECTION, SOLUTION SUBCUTANEOUS ONCE
Refills: 0 | Status: DISCONTINUED | OUTPATIENT
Start: 2019-12-09 | End: 2019-12-16

## 2019-12-09 RX ORDER — ATORVASTATIN CALCIUM 80 MG/1
20 TABLET, FILM COATED ORAL AT BEDTIME
Refills: 0 | Status: DISCONTINUED | OUTPATIENT
Start: 2019-12-09 | End: 2019-12-10

## 2019-12-09 RX ORDER — PANTOPRAZOLE SODIUM 20 MG/1
40 TABLET, DELAYED RELEASE ORAL DAILY
Refills: 0 | Status: DISCONTINUED | OUTPATIENT
Start: 2019-12-09 | End: 2019-12-10

## 2019-12-09 RX ORDER — DEXTROSE 50 % IN WATER 50 %
12.5 SYRINGE (ML) INTRAVENOUS ONCE
Refills: 0 | Status: DISCONTINUED | OUTPATIENT
Start: 2019-12-09 | End: 2019-12-16

## 2019-12-09 RX ORDER — SODIUM CHLORIDE 9 MG/ML
500 INJECTION INTRAMUSCULAR; INTRAVENOUS; SUBCUTANEOUS ONCE
Refills: 0 | Status: COMPLETED | OUTPATIENT
Start: 2019-12-09 | End: 2019-12-09

## 2019-12-09 RX ORDER — DEXTROSE 50 % IN WATER 50 %
15 SYRINGE (ML) INTRAVENOUS ONCE
Refills: 0 | Status: DISCONTINUED | OUTPATIENT
Start: 2019-12-09 | End: 2019-12-16

## 2019-12-09 RX ORDER — INSULIN LISPRO 100/ML
VIAL (ML) SUBCUTANEOUS EVERY 6 HOURS
Refills: 0 | Status: DISCONTINUED | OUTPATIENT
Start: 2019-12-09 | End: 2019-12-13

## 2019-12-09 RX ORDER — FOLIC ACID 0.8 MG
1 TABLET ORAL DAILY
Refills: 0 | Status: DISCONTINUED | OUTPATIENT
Start: 2019-12-09 | End: 2019-12-10

## 2019-12-09 RX ORDER — FENTANYL CITRATE 50 UG/ML
100 INJECTION INTRAVENOUS ONCE
Refills: 0 | Status: DISCONTINUED | OUTPATIENT
Start: 2019-12-09 | End: 2019-12-09

## 2019-12-09 RX ORDER — DEXMEDETOMIDINE HYDROCHLORIDE IN 0.9% SODIUM CHLORIDE 4 UG/ML
0.2 INJECTION INTRAVENOUS
Qty: 200 | Refills: 0 | Status: DISCONTINUED | OUTPATIENT
Start: 2019-12-09 | End: 2019-12-10

## 2019-12-09 RX ORDER — SODIUM CHLORIDE 9 MG/ML
1000 INJECTION, SOLUTION INTRAVENOUS
Refills: 0 | Status: DISCONTINUED | OUTPATIENT
Start: 2019-12-09 | End: 2019-12-16

## 2019-12-09 RX ORDER — ATORVASTATIN CALCIUM 80 MG/1
1 TABLET, FILM COATED ORAL
Qty: 0 | Refills: 0 | DISCHARGE

## 2019-12-09 RX ORDER — MIDAZOLAM HYDROCHLORIDE 1 MG/ML
4 INJECTION, SOLUTION INTRAMUSCULAR; INTRAVENOUS ONCE
Refills: 0 | Status: DISCONTINUED | OUTPATIENT
Start: 2019-12-09 | End: 2019-12-09

## 2019-12-09 RX ADMIN — Medication 1: at 23:20

## 2019-12-09 RX ADMIN — PROPOFOL 4.97 MICROGRAM(S)/KG/MIN: 10 INJECTION, EMULSION INTRAVENOUS at 07:39

## 2019-12-09 RX ADMIN — Medication 7.24 MICROGRAM(S)/KG/MIN: at 07:40

## 2019-12-09 RX ADMIN — CHLORHEXIDINE GLUCONATE 15 MILLILITER(S): 213 SOLUTION TOPICAL at 05:53

## 2019-12-09 RX ADMIN — ATORVASTATIN CALCIUM 20 MILLIGRAM(S): 80 TABLET, FILM COATED ORAL at 21:52

## 2019-12-09 RX ADMIN — SODIUM CHLORIDE 60 MILLILITER(S): 9 INJECTION INTRAMUSCULAR; INTRAVENOUS; SUBCUTANEOUS at 05:53

## 2019-12-09 RX ADMIN — MIDAZOLAM HYDROCHLORIDE 4 MILLIGRAM(S): 1 INJECTION, SOLUTION INTRAMUSCULAR; INTRAVENOUS at 07:38

## 2019-12-09 RX ADMIN — CHLORHEXIDINE GLUCONATE 1 APPLICATION(S): 213 SOLUTION TOPICAL at 17:44

## 2019-12-09 RX ADMIN — FENTANYL CITRATE 100 MICROGRAM(S): 50 INJECTION INTRAVENOUS at 10:33

## 2019-12-09 RX ADMIN — PANTOPRAZOLE SODIUM 40 MILLIGRAM(S): 20 TABLET, DELAYED RELEASE ORAL at 12:39

## 2019-12-09 RX ADMIN — Medication 105 MILLIGRAM(S): at 14:09

## 2019-12-09 RX ADMIN — Medication 105 MILLIGRAM(S): at 21:51

## 2019-12-09 RX ADMIN — PANTOPRAZOLE SODIUM 40 MILLIGRAM(S): 20 TABLET, DELAYED RELEASE ORAL at 05:53

## 2019-12-09 RX ADMIN — SODIUM CHLORIDE 500 MILLILITER(S): 9 INJECTION INTRAMUSCULAR; INTRAVENOUS; SUBCUTANEOUS at 00:09

## 2019-12-09 RX ADMIN — HEPARIN SODIUM 5000 UNIT(S): 5000 INJECTION INTRAVENOUS; SUBCUTANEOUS at 14:09

## 2019-12-09 RX ADMIN — Medication 1 MILLIGRAM(S): at 12:39

## 2019-12-09 RX ADMIN — Medication 1 TABLET(S): at 12:39

## 2019-12-09 RX ADMIN — CHLORHEXIDINE GLUCONATE 15 MILLILITER(S): 213 SOLUTION TOPICAL at 17:44

## 2019-12-09 RX ADMIN — HEPARIN SODIUM 5000 UNIT(S): 5000 INJECTION INTRAVENOUS; SUBCUTANEOUS at 05:53

## 2019-12-09 RX ADMIN — Medication 1: at 17:44

## 2019-12-09 RX ADMIN — DEXMEDETOMIDINE HYDROCHLORIDE IN 0.9% SODIUM CHLORIDE 3.86 MICROGRAM(S)/KG/HR: 4 INJECTION INTRAVENOUS at 20:08

## 2019-12-09 RX ADMIN — Medication 130 MILLIGRAM(S): at 20:05

## 2019-12-09 RX ADMIN — MIDAZOLAM HYDROCHLORIDE 1.66 MG/KG/HR: 1 INJECTION, SOLUTION INTRAMUSCULAR; INTRAVENOUS at 07:39

## 2019-12-09 RX ADMIN — HEPARIN SODIUM 5000 UNIT(S): 5000 INJECTION INTRAVENOUS; SUBCUTANEOUS at 21:51

## 2019-12-09 NOTE — PROGRESS NOTE ADULT - ATTENDING COMMENTS
59 M with acute onset of unresponsiveness, intubated for airway protection, found to have elevated etoh level.  This morning on rounds, he is awake and alert, moving limbs, and is mouthing words.  Do not suspect basilar artery thrombosis clinically, will d/w neurology.  Wean off sedation, switch to Precedex and start phenobarb for possible etoh withdrawal.  No signs of infection.

## 2019-12-09 NOTE — PROGRESS NOTE ADULT - SUBJECTIVE AND OBJECTIVE BOX
CHIEF COMPLAINT:  HPI  Patient is a 59Y M with PMH DM, HTN, HLD who presents with acute onset of altered mental status. Per niece at bedside, she was upstairs and heard a thump (unclear if patient fell or if he dropped something). Niece heard her grandmother, who was in the kitchen at the time, call out for her so she went to evaluate her uncle who was in the middle of eating dinner. Patient told his niece that he was not hungry anymore and he did not feel so good. He stated he was seeing something at the top of his eyes and then slumped over at the table. Was not arousable per family, though niece states he was squeezing her hand en route in EMS. Code stroke called on arrival.    CT Head negative for hemorrhage or acute infarct. While pending CTA, patient vomited and was subsequently intubated for airway protection. He was then noted to be moving all extremities and had a left upwards gaze deviation. Shortly after, was noticed to have adducted, contracted and rigid RUE as well as episode of urinary incontinence. No prior hx of seizures. In the ED, patient's tylenol and salicyclate level was wnl, Ethanol level was elevated at 246. electrolytes are wnl. Started on propofol for sedation.    Niece endorses hx of focal seizures but is unsure of rest of family hx. Patients mother unable to be reached at time of exam.      Interval Events:    REVIEW OF SYSTEMS:  Constitutional:   Eyes:  ENT:  CV:  Resp:  GI:  :  MSK:  Integumentary:  Neurological:  Psychiatric:  Endocrine:  Hematologic/Lymphatic:  Allergic/Immunologic:  [ ] All other systems negative  [ ] Unable to assess ROS because ________    OBJECTIVE:  ICU Vital Signs Last 24 Hrs  T(C): 37.2 (09 Dec 2019 04:00), Max: 37.2 (09 Dec 2019 04:00)  T(F): 99 (09 Dec 2019 04:00), Max: 99 (09 Dec 2019 04:00)  HR: 84 (09 Dec 2019 07:28) (62 - 85)  BP: 123/85 (09 Dec 2019 06:00) (79/55 - 125/75)  BP(mean): 97 (09 Dec 2019 06:00) (61 - 97)  ABP: --  ABP(mean): --  RR: 16 (09 Dec 2019 06:00) (12 - 17)  SpO2: 100% (09 Dec 2019 07:28) (98% - 100%)    Mode: AC/ CMV (Assist Control/ Continuous Mandatory Ventilation), RR (machine): 12, TV (machine): 400, FiO2: 40, PEEP: 5, MAP: 8, PIP: 19     @ 07:01  -   @ 07:00  --------------------------------------------------------  IN: 2298 mL / OUT: 1300 mL / NET: 998 mL      CAPILLARY BLOOD GLUCOSE      POCT Blood Glucose.: 190 mg/dL (09 Dec 2019 06:29)      PHYSICAL EXAM:  General:   HEENT:   Lymph Nodes:  Neck:   Respiratory:   Cardiovascular:   Abdomen:   Extremities:   Skin:   Neurological:  Psychiatry:    HOSPITAL MEDICATIONS:  MEDICATIONS  (STANDING):  chlorhexidine 0.12% Liquid 15 milliLiter(s) Oral Mucosa every 12 hours  chlorhexidine 4% Liquid 1 Application(s) Topical <User Schedule>  heparin  Injectable 5000 Unit(s) SubCutaneous every 8 hours  midazolam Infusion 0.02 mG/kG/Hr (1.656 mL/Hr) IV Continuous <Continuous>  multivitamin/thiamine/folic acid in sodium chloride 0.9% 1000 milliLiter(s) (150 mL/Hr) IV Continuous <Continuous>  norepinephrine Infusion 0.05 MICROgram(s)/kG/Min (7.238 mL/Hr) IV Continuous <Continuous>  pantoprazole  Injectable 40 milliGRAM(s) IV Push two times a day  propofol Infusion 10 MICROgram(s)/kG/Min (4.968 mL/Hr) IV Continuous <Continuous>  thiamine IVPB 500 milliGRAM(s) IV Intermittent three times a day    MEDICATIONS  (PRN):      LABS:                        12.7   8.82  )-----------( 234      ( 09 Dec 2019 03:00 )             40.0         141  |  107  |  12  ----------------------------<  196<H>  3.7   |  16<L>  |  0.68    Ca    7.9<L>      09 Dec 2019 03:00  Phos  3.5       Mg     1.6         TPro  6.4  /  Alb  3.5  /  TBili  < 0.2<L>  /  DBili  x   /  AST  41<H>  /  ALT  48<H>  /  AlkPhos  73      PT/INR - ( 08 Dec 2019 17:45 )   PT: 10.0 SEC;   INR: 0.90          PTT - ( 08 Dec 2019 17:45 )  PTT:29.6 SEC  Urinalysis Basic - ( 08 Dec 2019 18:51 )    Color: LIGHT YELLOW / Appearance: CLEAR / S.016 / pH: 6.0  Gluc: NEGATIVE / Ketone: SMALL  / Bili: NEGATIVE / Urobili: NORMAL   Blood: NEGATIVE / Protein: 200 / Nitrite: NEGATIVE   Leuk Esterase: NEGATIVE / RBC: 3-5 / WBC 0-2   Sq Epi: OCC / Non Sq Epi: x / Bacteria: NEGATIVE      Arterial Blood Gas:   @ 04:05  7.31/42/137/21/98.8/-4.5  ABG lactate: 4.8  Arterial Blood Gas:   @ 23:10  7.26/46/203/19/99.1/-6.1  ABG lactate: 5.1    Venous Blood Gas:   @ 03:00  7.30/41/104/19/97.3  VBG Lactate: 5.3  Venous Blood Gas:   @ 17:45  7.31/49/27/21/38.2  VBG Lactate: 3.9      MICROBIOLOGY:     RADIOLOGY:  [ ] Reviewed and interpreted by me    EKG: CHIEF COMPLAINT:     Interval Events: No acute events overnight. This morning patient required 4mg of versed, as pt was uncomfortable, moving arms.     REVIEW OF SYSTEMS:  unable to assess as patient intubated on sedation.     OBJECTIVE:  ICU Vital Signs Last 24 Hrs  T(C): 37.2 (09 Dec 2019 04:00), Max: 37.2 (09 Dec 2019 04:00)  T(F): 99 (09 Dec 2019 04:00), Max: 99 (09 Dec 2019 04:00)  HR: 84 (09 Dec 2019 07:28) (62 - 85)  BP: 123/85 (09 Dec 2019 06:00) (79/55 - 125/75)  BP(mean): 97 (09 Dec 2019 06:00) (61 - 97)  ABP: --  ABP(mean): --  RR: 16 (09 Dec 2019 06:00) (12 - 17)  SpO2: 100% (09 Dec 2019 07:28) (98% - 100%)    Mode: AC/ CMV (Assist Control/ Continuous Mandatory Ventilation), RR (machine): 12, TV (machine): 400, FiO2: 40, PEEP: 5, MAP: 8, PIP: 19     @ 07:01  -  12-09 @ 07:00  --------------------------------------------------------  IN: 2298 mL / OUT: 1300 mL / NET: 998 mL      CAPILLARY BLOOD GLUCOSE      POCT Blood Glucose.: 190 mg/dL (09 Dec 2019 06:29)        PHYSICAL EXAM:  GENERAL: intubated and sedated  HEAD:  Atraumatic, Normocephalic  ENMT: Moist mucous membranesd  HEART: Regular rate and rhythm; No murmurs, rubs, or gallops  RESPIRATORY: CTA B/L on anterior quadrants, transmitted vent sounds noted  ABDOMEN: Soft, Nontender, Nondistended; Bowel sounds present  NEUROLOGY: limited as patient is on sedation  EXTREMITIES: No pedal edema, warm extremities   SKIN: warm, dry, normal color, no rash or abnormal lesions        HOSPITAL MEDICATIONS:  MEDICATIONS  (STANDING):  chlorhexidine 0.12% Liquid 15 milliLiter(s) Oral Mucosa every 12 hours  chlorhexidine 4% Liquid 1 Application(s) Topical <User Schedule>  heparin  Injectable 5000 Unit(s) SubCutaneous every 8 hours  midazolam Infusion 0.02 mG/kG/Hr (1.656 mL/Hr) IV Continuous <Continuous>  multivitamin/thiamine/folic acid in sodium chloride 0.9% 1000 milliLiter(s) (150 mL/Hr) IV Continuous <Continuous>  norepinephrine Infusion 0.05 MICROgram(s)/kG/Min (7.238 mL/Hr) IV Continuous <Continuous>  pantoprazole  Injectable 40 milliGRAM(s) IV Push two times a day  propofol Infusion 10 MICROgram(s)/kG/Min (4.968 mL/Hr) IV Continuous <Continuous>  thiamine IVPB 500 milliGRAM(s) IV Intermittent three times a day    MEDICATIONS  (PRN):      LABS:                        12.7   8.82  )-----------( 234      ( 09 Dec 2019 03:00 )             40.0         141  |  107  |  12  ----------------------------<  196<H>  3.7   |  16<L>  |  0.68    Ca    7.9<L>      09 Dec 2019 03:00  Phos  3.5       Mg     1.6         TPro  6.4  /  Alb  3.5  /  TBili  < 0.2<L>  /  DBili  x   /  AST  41<H>  /  ALT  48<H>  /  AlkPhos  73      PT/INR - ( 08 Dec 2019 17:45 )   PT: 10.0 SEC;   INR: 0.90          PTT - ( 08 Dec 2019 17:45 )  PTT:29.6 SEC  Urinalysis Basic - ( 08 Dec 2019 18:51 )    Color: LIGHT YELLOW / Appearance: CLEAR / S.016 / pH: 6.0  Gluc: NEGATIVE / Ketone: SMALL  / Bili: NEGATIVE / Urobili: NORMAL   Blood: NEGATIVE / Protein: 200 / Nitrite: NEGATIVE   Leuk Esterase: NEGATIVE / RBC: 3-5 / WBC 0-2   Sq Epi: OCC / Non Sq Epi: x / Bacteria: NEGATIVE      Arterial Blood Gas:   @ 04:05  7.31/42/137/21/98.8/-4.5  ABG lactate: 4.8  Arterial Blood Gas:   @ 23:10  7.26/46/203/19/99.1/-6.1  ABG lactate: 5.1    Venous Blood Gas:   @ 03:00  7.30/41/104/19/97.3  VBG Lactate: 5.3  Venous Blood Gas:   @ 17:45  7.31/49/27/21/38.2  VBG Lactate: 3.9      MICROBIOLOGY:     RADIOLOGY:  [ ] Reviewed and interpreted by me    EKG: CHIEF COMPLAINT:     Interval Events: No acute events overnight. This morning patient required 4mg of versed and fentanyl 100mg push, as pt was uncomfortable, moving arms.     REVIEW OF SYSTEMS:  unable to assess as patient intubated on sedation.     OBJECTIVE:  ICU Vital Signs Last 24 Hrs  T(C): 37.2 (09 Dec 2019 04:00), Max: 37.2 (09 Dec 2019 04:00)  T(F): 99 (09 Dec 2019 04:00), Max: 99 (09 Dec 2019 04:00)  HR: 84 (09 Dec 2019 07:28) (62 - 85)  BP: 123/85 (09 Dec 2019 06:00) (79/55 - 125/75)  BP(mean): 97 (09 Dec 2019 06:00) (61 - 97)  ABP: --  ABP(mean): --  RR: 16 (09 Dec 2019 06:00) (12 - 17)  SpO2: 100% (09 Dec 2019 07:28) (98% - 100%)    Mode: AC/ CMV (Assist Control/ Continuous Mandatory Ventilation), RR (machine): 12, TV (machine): 400, FiO2: 40, PEEP: 5, MAP: 8, PIP: 19     @ 07:01  -  12-09 @ 07:00  --------------------------------------------------------  IN: 2298 mL / OUT: 1300 mL / NET: 998 mL      CAPILLARY BLOOD GLUCOSE      POCT Blood Glucose.: 190 mg/dL (09 Dec 2019 06:29)        PHYSICAL EXAM:  GENERAL: intubated and sedated  HEAD:  Atraumatic, Normocephalic  ENMT: Moist mucous membranesd  HEART: Regular rate and rhythm; No murmurs, rubs, or gallops  RESPIRATORY: CTA B/L on anterior quadrants, transmitted vent sounds noted  ABDOMEN: Soft, Nontender, Nondistended; Bowel sounds present  NEUROLOGY: limited as patient is on sedation  EXTREMITIES: No pedal edema, warm extremities   SKIN: warm, dry, normal color, no rash or abnormal lesions        HOSPITAL MEDICATIONS:  MEDICATIONS  (STANDING):  chlorhexidine 0.12% Liquid 15 milliLiter(s) Oral Mucosa every 12 hours  chlorhexidine 4% Liquid 1 Application(s) Topical <User Schedule>  heparin  Injectable 5000 Unit(s) SubCutaneous every 8 hours  midazolam Infusion 0.02 mG/kG/Hr (1.656 mL/Hr) IV Continuous <Continuous>  multivitamin/thiamine/folic acid in sodium chloride 0.9% 1000 milliLiter(s) (150 mL/Hr) IV Continuous <Continuous>  norepinephrine Infusion 0.05 MICROgram(s)/kG/Min (7.238 mL/Hr) IV Continuous <Continuous>  pantoprazole  Injectable 40 milliGRAM(s) IV Push two times a day  propofol Infusion 10 MICROgram(s)/kG/Min (4.968 mL/Hr) IV Continuous <Continuous>  thiamine IVPB 500 milliGRAM(s) IV Intermittent three times a day    MEDICATIONS  (PRN):      LABS:                        12.7   8.82  )-----------( 234      ( 09 Dec 2019 03:00 )             40.0         141  |  107  |  12  ----------------------------<  196<H>  3.7   |  16<L>  |  0.68    Ca    7.9<L>      09 Dec 2019 03:00  Phos  3.5       Mg     1.6         TPro  6.4  /  Alb  3.5  /  TBili  < 0.2<L>  /  DBili  x   /  AST  41<H>  /  ALT  48<H>  /  AlkPhos  73      PT/INR - ( 08 Dec 2019 17:45 )   PT: 10.0 SEC;   INR: 0.90          PTT - ( 08 Dec 2019 17:45 )  PTT:29.6 SEC  Urinalysis Basic - ( 08 Dec 2019 18:51 )    Color: LIGHT YELLOW / Appearance: CLEAR / S.016 / pH: 6.0  Gluc: NEGATIVE / Ketone: SMALL  / Bili: NEGATIVE / Urobili: NORMAL   Blood: NEGATIVE / Protein: 200 / Nitrite: NEGATIVE   Leuk Esterase: NEGATIVE / RBC: 3-5 / WBC 0-2   Sq Epi: OCC / Non Sq Epi: x / Bacteria: NEGATIVE      Arterial Blood Gas:   @ 04:05  7.31/42/137/21/98.8/-4.5  ABG lactate: 4.8  Arterial Blood Gas:   @ 23:10  7.26/46/203/19/99.1/-6.1  ABG lactate: 5.1    Venous Blood Gas:   @ 03:00  7.30/41/104/19/97.3  VBG Lactate: 5.3  Venous Blood Gas:   @ 17:45  7.31/49/27/21/38.2  VBG Lactate: 3.9      MICROBIOLOGY:     RADIOLOGY:  [ ] Reviewed and interpreted by me    EKG:

## 2019-12-09 NOTE — CHART NOTE - NSCHARTNOTEFT_GEN_A_CORE
Case reviewed.   Please obtain CTA Head and Neck to r/o posterior circulation thrombus asap  d/w MICU team    kindly inform [phone] once done Case reviewed.   Please obtain CTA Head and Neck to r/o posterior circulation thrombus once able to, given sudden change in mentation, he may have had posterior circulation TIA  d/w MICU team    kindly inform [phone] once done

## 2019-12-09 NOTE — CHART NOTE - NSCHARTNOTEFT_GEN_A_CORE
MICU Transfer Note    Transfer from: MICU    Transfer to: (  ) Medicine    (  ) Telemetry     (   ) RCU        (    ) Palliative         (   ) Stroke Unit          (   ) __________________    Accepting Physician:  Signout given to:     HPI:  Patient is a 59Y M with PMH DM, HTN, HLD who presents with acute onset of altered mental status. Per niece at bedside, she was upstairs and heard a thump (unclear if patient fell or if he dropped something). Niece heard her grandmother, who was in the kitchen at the time, call out for her so she went to evaluate her uncle who was in the middle of eating dinner. Patient told his niece that he was not hungry anymore and he did not feel so good. He stated he was seeing something at the top of his eyes and then slumped over at the table. Was not arousable per family, though niece states he was squeezing her hand en route in EMS. Code stroke called on arrival.    CT Head negative for hemorrhage or acute infarct. While pending CTA, patient vomited and was subsequently intubated for airway protection. He was then noted to be moving all extremities and had a left upwards gaze deviation. Shortly after, was noticed to have adducted, contracted and rigid RUE as well as episode of urinary incontinence. No prior hx of seizures. In the ED, patient's tylenol and salicyclate level was wnl, Ethanol level was elevated at 246. electrolytes are wnl. Started on propofol for sedation.    Niece endorses hx of focal seizures but is unsure of rest of family hx. Patients mother unable to be reached at time of exam.        MICU COURSE:    Patient intubated for airway protection, sedated on propofol and midazolam. Neurology consulted and recommended MRI to r/o structural abnormalities.       ASSESSMENT & PLAN:             FOR FOLLOW UP:

## 2019-12-10 LAB
ALBUMIN SERPL ELPH-MCNC: 3.2 G/DL — LOW (ref 3.3–5)
ALP SERPL-CCNC: 65 U/L — SIGNIFICANT CHANGE UP (ref 40–120)
ALT FLD-CCNC: 35 U/L — SIGNIFICANT CHANGE UP (ref 4–41)
AMMONIA BLD-MCNC: 75 UMOL/L — HIGH (ref 11–55)
ANION GAP SERPL CALC-SCNC: 11 MMO/L — SIGNIFICANT CHANGE UP (ref 7–14)
AST SERPL-CCNC: 24 U/L — SIGNIFICANT CHANGE UP (ref 4–40)
BACTERIA UR CULT: SIGNIFICANT CHANGE UP
BASE EXCESS BLDA CALC-SCNC: -0.3 MMOL/L — SIGNIFICANT CHANGE UP
BASOPHILS # BLD AUTO: 0.02 K/UL — SIGNIFICANT CHANGE UP (ref 0–0.2)
BASOPHILS NFR BLD AUTO: 0.2 % — SIGNIFICANT CHANGE UP (ref 0–2)
BILIRUB SERPL-MCNC: 0.4 MG/DL — SIGNIFICANT CHANGE UP (ref 0.2–1.2)
BLOOD GAS ARTERIAL - FIO2: 40 — SIGNIFICANT CHANGE UP
BUN SERPL-MCNC: 11 MG/DL — SIGNIFICANT CHANGE UP (ref 7–23)
CALCIUM SERPL-MCNC: 8 MG/DL — LOW (ref 8.4–10.5)
CHLORIDE BLDA-SCNC: 107 MMOL/L — SIGNIFICANT CHANGE UP (ref 96–108)
CHLORIDE SERPL-SCNC: 105 MMOL/L — SIGNIFICANT CHANGE UP (ref 98–107)
CO2 SERPL-SCNC: 21 MMOL/L — LOW (ref 22–31)
CREAT SERPL-MCNC: 0.7 MG/DL — SIGNIFICANT CHANGE UP (ref 0.5–1.3)
EOSINOPHIL # BLD AUTO: 0.16 K/UL — SIGNIFICANT CHANGE UP (ref 0–0.5)
EOSINOPHIL NFR BLD AUTO: 1.9 % — SIGNIFICANT CHANGE UP (ref 0–6)
GLUCOSE BLDA-MCNC: 171 MG/DL — HIGH (ref 70–99)
GLUCOSE SERPL-MCNC: 188 MG/DL — HIGH (ref 70–99)
GRAM STN SPT: SIGNIFICANT CHANGE UP
HCO3 BLDA-SCNC: 24 MMOL/L — SIGNIFICANT CHANGE UP (ref 22–26)
HCT VFR BLD CALC: 36.8 % — LOW (ref 39–50)
HCT VFR BLDA CALC: 37.2 % — LOW (ref 39–51)
HGB BLD-MCNC: 12 G/DL — LOW (ref 13–17)
HGB BLDA-MCNC: 12.1 G/DL — LOW (ref 13–17)
IMM GRANULOCYTES NFR BLD AUTO: 0.4 % — SIGNIFICANT CHANGE UP (ref 0–1.5)
LACTATE BLDA-SCNC: 1.1 MMOL/L — SIGNIFICANT CHANGE UP (ref 0.5–2)
LYMPHOCYTES # BLD AUTO: 2.44 K/UL — SIGNIFICANT CHANGE UP (ref 1–3.3)
LYMPHOCYTES # BLD AUTO: 29.5 % — SIGNIFICANT CHANGE UP (ref 13–44)
MAGNESIUM SERPL-MCNC: 1.8 MG/DL — SIGNIFICANT CHANGE UP (ref 1.6–2.6)
MCHC RBC-ENTMCNC: 27 PG — SIGNIFICANT CHANGE UP (ref 27–34)
MCHC RBC-ENTMCNC: 32.6 % — SIGNIFICANT CHANGE UP (ref 32–36)
MCV RBC AUTO: 82.7 FL — SIGNIFICANT CHANGE UP (ref 80–100)
MONOCYTES # BLD AUTO: 0.54 K/UL — SIGNIFICANT CHANGE UP (ref 0–0.9)
MONOCYTES NFR BLD AUTO: 6.5 % — SIGNIFICANT CHANGE UP (ref 2–14)
NEUTROPHILS # BLD AUTO: 5.09 K/UL — SIGNIFICANT CHANGE UP (ref 1.8–7.4)
NEUTROPHILS NFR BLD AUTO: 61.5 % — SIGNIFICANT CHANGE UP (ref 43–77)
NRBC # FLD: 0 K/UL — SIGNIFICANT CHANGE UP (ref 0–0)
PCO2 BLDA: 39 MMHG — SIGNIFICANT CHANGE UP (ref 35–48)
PH BLDA: 7.4 PH — SIGNIFICANT CHANGE UP (ref 7.35–7.45)
PHOSPHATE SERPL-MCNC: 2.5 MG/DL — SIGNIFICANT CHANGE UP (ref 2.5–4.5)
PLATELET # BLD AUTO: 176 K/UL — SIGNIFICANT CHANGE UP (ref 150–400)
PMV BLD: 10.9 FL — SIGNIFICANT CHANGE UP (ref 7–13)
PO2 BLDA: 207 MMHG — HIGH (ref 83–108)
POTASSIUM BLDA-SCNC: 3.7 MMOL/L — SIGNIFICANT CHANGE UP (ref 3.4–4.5)
POTASSIUM SERPL-MCNC: 3.8 MMOL/L — SIGNIFICANT CHANGE UP (ref 3.5–5.3)
POTASSIUM SERPL-SCNC: 3.8 MMOL/L — SIGNIFICANT CHANGE UP (ref 3.5–5.3)
PROT SERPL-MCNC: 6.2 G/DL — SIGNIFICANT CHANGE UP (ref 6–8.3)
RBC # BLD: 4.45 M/UL — SIGNIFICANT CHANGE UP (ref 4.2–5.8)
RBC # FLD: 12.6 % — SIGNIFICANT CHANGE UP (ref 10.3–14.5)
SAO2 % BLDA: 99.7 % — HIGH (ref 95–99)
SODIUM BLDA-SCNC: 132 MMOL/L — LOW (ref 136–146)
SODIUM SERPL-SCNC: 137 MMOL/L — SIGNIFICANT CHANGE UP (ref 135–145)
SPECIMEN SOURCE: SIGNIFICANT CHANGE UP
WBC # BLD: 8.28 K/UL — SIGNIFICANT CHANGE UP (ref 3.8–10.5)
WBC # FLD AUTO: 8.28 K/UL — SIGNIFICANT CHANGE UP (ref 3.8–10.5)

## 2019-12-10 PROCEDURE — 70553 MRI BRAIN STEM W/O & W/DYE: CPT | Mod: 26

## 2019-12-10 PROCEDURE — 99291 CRITICAL CARE FIRST HOUR: CPT

## 2019-12-10 PROCEDURE — 99232 SBSQ HOSP IP/OBS MODERATE 35: CPT | Mod: GC

## 2019-12-10 PROCEDURE — 95819 EEG AWAKE AND ASLEEP: CPT | Mod: 26

## 2019-12-10 RX ORDER — BENZOCAINE AND MENTHOL 5; 1 G/100ML; G/100ML
1 LIQUID ORAL EVERY 4 HOURS
Refills: 0 | Status: DISCONTINUED | OUTPATIENT
Start: 2019-12-10 | End: 2019-12-16

## 2019-12-10 RX ORDER — ATORVASTATIN CALCIUM 80 MG/1
20 TABLET, FILM COATED ORAL AT BEDTIME
Refills: 0 | Status: DISCONTINUED | OUTPATIENT
Start: 2019-12-10 | End: 2019-12-16

## 2019-12-10 RX ORDER — PANTOPRAZOLE SODIUM 20 MG/1
40 TABLET, DELAYED RELEASE ORAL
Refills: 0 | Status: DISCONTINUED | OUTPATIENT
Start: 2019-12-10 | End: 2019-12-16

## 2019-12-10 RX ORDER — ACETAMINOPHEN 500 MG
1000 TABLET ORAL ONCE
Refills: 0 | Status: COMPLETED | OUTPATIENT
Start: 2019-12-10 | End: 2019-12-10

## 2019-12-10 RX ORDER — INFLUENZA VIRUS VACCINE 15; 15; 15; 15 UG/.5ML; UG/.5ML; UG/.5ML; UG/.5ML
0.5 SUSPENSION INTRAMUSCULAR ONCE
Refills: 0 | Status: DISCONTINUED | OUTPATIENT
Start: 2019-12-10 | End: 2019-12-16

## 2019-12-10 RX ORDER — FOLIC ACID 0.8 MG
1 TABLET ORAL DAILY
Refills: 0 | Status: DISCONTINUED | OUTPATIENT
Start: 2019-12-10 | End: 2019-12-16

## 2019-12-10 RX ORDER — PANTOPRAZOLE SODIUM 20 MG/1
40 TABLET, DELAYED RELEASE ORAL
Refills: 0 | Status: DISCONTINUED | OUTPATIENT
Start: 2019-12-10 | End: 2019-12-10

## 2019-12-10 RX ORDER — SODIUM CHLORIDE 9 MG/ML
1000 INJECTION, SOLUTION INTRAVENOUS
Refills: 0 | Status: DISCONTINUED | OUTPATIENT
Start: 2019-12-10 | End: 2019-12-12

## 2019-12-10 RX ADMIN — CHLORHEXIDINE GLUCONATE 1 APPLICATION(S): 213 SOLUTION TOPICAL at 12:27

## 2019-12-10 RX ADMIN — CHLORHEXIDINE GLUCONATE 15 MILLILITER(S): 213 SOLUTION TOPICAL at 05:12

## 2019-12-10 RX ADMIN — Medication 105 MILLIGRAM(S): at 18:31

## 2019-12-10 RX ADMIN — Medication 4 MILLIGRAM(S): at 23:45

## 2019-12-10 RX ADMIN — Medication 105 MILLIGRAM(S): at 22:56

## 2019-12-10 RX ADMIN — Medication 1: at 05:12

## 2019-12-10 RX ADMIN — BENZOCAINE AND MENTHOL 1 LOZENGE: 5; 1 LIQUID ORAL at 23:45

## 2019-12-10 RX ADMIN — Medication 105 MILLIGRAM(S): at 05:12

## 2019-12-10 RX ADMIN — Medication 400 MILLIGRAM(S): at 00:34

## 2019-12-10 RX ADMIN — Medication 1000 MILLIGRAM(S): at 00:50

## 2019-12-10 RX ADMIN — PANTOPRAZOLE SODIUM 40 MILLIGRAM(S): 20 TABLET, DELAYED RELEASE ORAL at 12:24

## 2019-12-10 RX ADMIN — Medication 130 MILLIGRAM(S): at 00:06

## 2019-12-10 RX ADMIN — HEPARIN SODIUM 5000 UNIT(S): 5000 INJECTION INTRAVENOUS; SUBCUTANEOUS at 22:56

## 2019-12-10 RX ADMIN — Medication 130 MILLIGRAM(S): at 09:19

## 2019-12-10 RX ADMIN — HEPARIN SODIUM 5000 UNIT(S): 5000 INJECTION INTRAVENOUS; SUBCUTANEOUS at 05:12

## 2019-12-10 NOTE — CHART NOTE - NSCHARTNOTEFT_GEN_A_CORE
MICU Transfer Note    Transfer from: MICU  Transfer to:  ( x ) Medicine    (  ) Telemetry    (  ) RCU    (  ) Palliative    (  ) Stroke Unit    (  ) _______________  Accepting physican:      MICU COURSE:  dx: NCSE likely secondary to severe alcohol intoxication  vs now onset seizure disorder    Patient is a 59Y M with PMH DM, HTN, HLD who presents with acute onset of altered mental status. Per niece at bedside, she was upstairs and heard a thump (unclear if patient fell or if he dropped something). Niece heard her grandmother, who was in the kitchen at the time, call out for her so she went to evaluate her uncle who was in the middle of eating dinner. Patient told his niece that he was not hungry anymore and he did not feel so good. He stated he was seeing something at the top of his eyes and then slumped over at the table.  Pt was not arousable per family, though niece states he was squeezing her hand en route in EMS. Code stroke called on arrival. CT Head negative for hemorrhage or acute infarct. While pending CTA, patient vomited and was subsequently intubated for airway protection. He was then noted to be moving all extremities and had a left upwards gaze deviation. Shortly after, was noticed to have adducted, contracted and rigid RUE as well as episode of urinary incontinence in the setting of a  tonic clonic seizure. No prior hx of seizures. In the ED, patient's tylenol and salicyclate level was wnl, Ethanol level was elevated at 246. electrolytes are wnl. Started on propofol for sedation      MICU COURSE:    Patient intubated for airway protection, sedated on propofol and midazolam. Patient was Keppra loaded and started on phenobarb PRN for alcohol withdrawal. Neurology was consulted and recommended to pursue metabolic/infectious work up, vEEG and MRI brain. Patient had an episode of low grade fever, blood cultures were obtained and are pending. endotracheal cultures are growing gram negative rods. Urine culture is negative. Patient being monitored off antibiotics. Metabolic wise, patient with no gross electrolyte disturbances, aside from bicarb 21 and ammonia level of 25. Patient is now extubated, aox3, on CIWA. Patient has so far required 3 pushes of phenobarb in the setting of agitation. Patient now on librium taper. MRI brain done today, read is pending. EEG pending.       ASSESSMENT & PLAN:   59Y M with PMH DM, HTN, HLD who presents with acute onset of unresponsiveness. CT Head negative for acute pathology, likely has Status epilepticus likely secondary to alcohol intoxication    #Neuro  Alcoholic intoxication vs new onset seizures  -ethanol level in blood 246, utox negative on admission   -s/p  banana bag, will cont MVT, folic acid and thiamine  -CT head negative   -per conversation needs CT angio, given concern for basilar artery stenosis as this may be a TIA   -Pending MRI read  -pending EEG  -s/p Keppra load, monitor off antiepileptics for no  - on librium taper and CIWA protocol     #Resp  -s/p intubation for airway protection, extubated overnight   -monitoring off antibiotics, saturating well on 2L NC      #CV  HTN  -takes enalapril 10mg at home per outpt pharmacy  -holding off antihypertensives  - monitor BPs and restart BP med as tolerated  -obtain official med rec, as rec obtained while pt intubated    #GI  --NPO for now  -get bedside S&S, order diet     #ID  -no acute signs of infection, however had low grade fever overnight  -will cont to monitor off abx  -UA negative for leuk esterase and nitrates  -CXR clear  -f/u blood cultures, ET culture growing gram negative angelika  - consider starting abx if unstable or concern for asp PNA    #Renal  -SCr 0.70  -will d/c rossi  -TOV    #Heme  -Hemoglobin 13.1, stable  -continue to monitor  -had brown liquid coming from OGT, low concern for GIB  -PPI q D      #Endo  DM  -iSS and FS for now  -Hg A1c 10.6  - will cont to monitor FS      #DVT PPx  -Heparin 5000 TID          For Follow-Up:  [] culture results  [] TOV  [] bed side swallow eval, diet   []fever curve, as currently off abx  [] clarify med rec, (med rec done when pt intubated)  [] CIWA and librium taper  [] F/u neurology recs  [] EEG   [] MRI read  [] order CTA to r/o basilar artery stenosis, per neuro request      Vital Signs Last 24 Hrs  T(C): 37.4 (10 Dec 2019 12:00), Max: 38.2 (10 Dec 2019 00:00)  T(F): 99.3 (10 Dec 2019 12:00), Max: 100.8 (10 Dec 2019 00:00)  HR: 71 (10 Dec 2019 12:00) (65 - 84)  BP: 108/75 (10 Dec 2019 12:00) (90/58 - 139/83)  BP(mean): 87 (10 Dec 2019 12:00) (68 - 97)  RR: 16 (10 Dec 2019 12:00) (13 - 22)  SpO2: 100% (10 Dec 2019 12:00) (86% - 100%)  I&O's Summary    09 Dec 2019 07:01  -  10 Dec 2019 07:00  --------------------------------------------------------  IN: 1026.2 mL / OUT: 1640 mL / NET: -613.8 mL    10 Dec 2019 07:01  -  10 Dec 2019 12:53  --------------------------------------------------------  IN: 0 mL / OUT: 50 mL / NET: -50 mL          MEDICATIONS  (STANDING):  atorvastatin 20 milliGRAM(s) Oral at bedtime  chlordiazePOXIDE   Oral   chlorhexidine 4% Liquid 1 Application(s) Topical <User Schedule>  dextrose 5%. 1000 milliLiter(s) (50 mL/Hr) IV Continuous <Continuous>  dextrose 50% Injectable 12.5 Gram(s) IV Push once  dextrose 50% Injectable 25 Gram(s) IV Push once  dextrose 50% Injectable 25 Gram(s) IV Push once  folic acid 1 milliGRAM(s) Oral daily  heparin  Injectable 5000 Unit(s) SubCutaneous every 8 hours  insulin lispro (HumaLOG) corrective regimen sliding scale   SubCutaneous every 6 hours  multivitamin 1 Tablet(s) Oral daily  pantoprazole  Injectable 40 milliGRAM(s) IV Push daily  thiamine IVPB 500 milliGRAM(s) IV Intermittent three times a day    MEDICATIONS  (PRN):  dextrose 40% Gel 15 Gram(s) Oral once PRN Blood Glucose LESS THAN 70 milliGRAM(s)/deciliter  glucagon  Injectable 1 milliGRAM(s) IntraMuscular once PRN Glucose LESS THAN 70 milligrams/deciliter  PHENobarbital Injectable 130 milliGRAM(s) IV Push every 15 minutes PRN for agitation and alcohol withdrawal        LABS                                            12.0                  Neurophils% (auto):   61.5   (12-10 @ 04:00):    8.28 )-----------(176          Lymphocytes% (auto):  29.5                                          36.8                   Eosinphils% (auto):   1.9      Manual%: Neutrophils x    ; Lymphocytes x    ; Eosinophils x    ; Bands%: x    ; Blasts x                                    137    |  105    |  11                  Calcium: 8.0   / iCa: x      (12-10 @ 04:00)    ----------------------------<  188       Magnesium: 1.8                              3.8     |  21     |  0.70             Phosphorous: 2.5      TPro  6.2    /  Alb  3.2    /  TBili  0.4    /  DBili  x      /  AST  24     /  ALT  35     /  AlkPhos  65     10 Dec 2019 04:00 MICU Transfer Note    Transfer from: MICU  Transfer to:  ( x ) Medicine    (  ) Telemetry    (  ) RCU    (  ) Palliative    (  ) Stroke Unit    (  ) _______________  Accepting physican:      MICU COURSE:  dx: NCSE likely secondary to severe alcohol intoxication  vs now onset seizure disorder    Patient is a 59Y M with PMH DM, HTN, HLD who presents with acute onset of altered mental status. Per niece at bedside, she was upstairs and heard a thump (unclear if patient fell or if he dropped something). Niece heard her grandmother, who was in the kitchen at the time, call out for her so she went to evaluate her uncle who was in the middle of eating dinner. Patient told his niece that he was not hungry anymore and he did not feel so good. He stated he was seeing something at the top of his eyes and then slumped over at the table.  Pt was not arousable per family, though niece states he was squeezing her hand en route in EMS. Code stroke called on arrival. CT Head negative for hemorrhage or acute infarct. While pending CTA, patient vomited and was subsequently intubated for airway protection. He was then noted to be moving all extremities and had a left upwards gaze deviation. Shortly after, was noticed to have adducted, contracted and rigid RUE as well as episode of urinary incontinence in the setting of a  tonic clonic seizure. No prior hx of seizures. In the ED, patient's tylenol and salicyclate level was wnl, Ethanol level was elevated at 246. electrolytes are wnl. Started on propofol for sedation      MICU COURSE:    Patient intubated for airway protection, sedated on propofol and midazolam. Patient was Keppra loaded and started on phenobarb PRN for alcohol withdrawal. Neurology was consulted and recommended to pursue metabolic/infectious work up, vEEG and MRI brain. Patient had an episode of low grade fever, blood cultures were obtained and are pending. endotracheal cultures are growing gram negative rods. Urine culture is negative. Patient being monitored off antibiotics. Metabolic wise, patient with no gross electrolyte disturbances, aside from bicarb 21 and ammonia level of 25. Patient is now extubated, aox3, on CIWA. Patient has so far required 3 pushes of phenobarb in the setting of agitation. Patient now on librium taper. MRI brain done today, read is pending. EEG pending.       ASSESSMENT & PLAN:   59Y M with PMH DM, HTN, HLD who presents with acute onset of unresponsiveness. CT Head negative for acute pathology, likely has Status epilepticus likely secondary to alcohol intoxication    #Neuro  Alcoholic intoxication vs new onset seizures  -ethanol level in blood 246, utox negative on admission   -s/p  banana bag, will cont MVT, folic acid and thiamine  -CT head negative   -per conversation needs CT angio, given concern for basilar artery stenosis as this may be a TIA   -Pending MRI read  -pending EEG  -s/p Keppra load, monitor off antiepileptics for no  - on ativan taper    #Resp  -s/p intubation for airway protection, extubated overnight   -monitoring off antibiotics, saturating well on 2L NC      #CV  HTN  -takes enalapril 10mg at home per outpt pharmacy  -holding off antihypertensives  - monitor BPs and restart BP med as tolerated  -obtain official med rec, as rec obtained while pt intubated    #GI  --NPO for now  -get official speech and swallow    #ID  -no acute signs of infection, however had low grade fever overnight  -will cont to monitor off abx  -UA negative for leuk esterase and nitrates  -CXR clear  -f/u blood cultures, ET culture growing gram negative angelika  - consider starting abx if unstable or concern for asp PNA    #Renal  -SCr 0.70  -will d/c rossi  -TOV    #Heme  -Hemoglobin 13.1, stable  -continue to monitor  -had brown liquid coming from OGT, low concern for GIB  -PPI q D      #Endo  DM  -iSS and FS for now  -Hg A1c 10.6  - will cont to monitor FS      #DVT PPx  -Heparin 5000 TID          For Follow-Up:  [] culture results  [] TOV  [] speech and swallow consult  []fever curve, as currently off abx  [] clarify med rec, (med rec done when pt intubated)  [] CIWA and librium taper  [] F/u neurology recs  [] EEG   [] MRI read  [] order CTA to r/o basilar artery stenosis, per neuro request      Vital Signs Last 24 Hrs  T(C): 37.4 (10 Dec 2019 12:00), Max: 38.2 (10 Dec 2019 00:00)  T(F): 99.3 (10 Dec 2019 12:00), Max: 100.8 (10 Dec 2019 00:00)  HR: 71 (10 Dec 2019 12:00) (65 - 84)  BP: 108/75 (10 Dec 2019 12:00) (90/58 - 139/83)  BP(mean): 87 (10 Dec 2019 12:00) (68 - 97)  RR: 16 (10 Dec 2019 12:00) (13 - 22)  SpO2: 100% (10 Dec 2019 12:00) (86% - 100%)  I&O's Summary    09 Dec 2019 07:01  -  10 Dec 2019 07:00  --------------------------------------------------------  IN: 1026.2 mL / OUT: 1640 mL / NET: -613.8 mL    10 Dec 2019 07:01  -  10 Dec 2019 12:53  --------------------------------------------------------  IN: 0 mL / OUT: 50 mL / NET: -50 mL          MEDICATIONS  (STANDING):  atorvastatin 20 milliGRAM(s) Oral at bedtime  chlordiazePOXIDE   Oral   chlorhexidine 4% Liquid 1 Application(s) Topical <User Schedule>  dextrose 5%. 1000 milliLiter(s) (50 mL/Hr) IV Continuous <Continuous>  dextrose 50% Injectable 12.5 Gram(s) IV Push once  dextrose 50% Injectable 25 Gram(s) IV Push once  dextrose 50% Injectable 25 Gram(s) IV Push once  folic acid 1 milliGRAM(s) Oral daily  heparin  Injectable 5000 Unit(s) SubCutaneous every 8 hours  insulin lispro (HumaLOG) corrective regimen sliding scale   SubCutaneous every 6 hours  multivitamin 1 Tablet(s) Oral daily  pantoprazole  Injectable 40 milliGRAM(s) IV Push daily  thiamine IVPB 500 milliGRAM(s) IV Intermittent three times a day    MEDICATIONS  (PRN):  dextrose 40% Gel 15 Gram(s) Oral once PRN Blood Glucose LESS THAN 70 milliGRAM(s)/deciliter  glucagon  Injectable 1 milliGRAM(s) IntraMuscular once PRN Glucose LESS THAN 70 milligrams/deciliter  PHENobarbital Injectable 130 milliGRAM(s) IV Push every 15 minutes PRN for agitation and alcohol withdrawal        LABS                                            12.0                  Neurophils% (auto):   61.5   (12-10 @ 04:00):    8.28 )-----------(176          Lymphocytes% (auto):  29.5                                          36.8                   Eosinphils% (auto):   1.9      Manual%: Neutrophils x    ; Lymphocytes x    ; Eosinophils x    ; Bands%: x    ; Blasts x                                    137    |  105    |  11                  Calcium: 8.0   / iCa: x      (12-10 @ 04:00)    ----------------------------<  188       Magnesium: 1.8                              3.8     |  21     |  0.70             Phosphorous: 2.5      TPro  6.2    /  Alb  3.2    /  TBili  0.4    /  DBili  x      /  AST  24     /  ALT  35     /  AlkPhos  65     10 Dec 2019 04:00 MICU Transfer Note    Transfer from: MICU  Transfer to:  ( x ) Medicine    (  ) Telemetry    (  ) RCU    (  ) Palliative    (  ) Stroke Unit    (  ) _______________  Accepting physican:      MICU COURSE:  dx: NCSE likely secondary to severe alcohol intoxication  vs now onset seizure disorder    Patient is a 59Y M with PMH DM, HTN, HLD who presents with acute onset of altered mental status. Per niece at bedside, she was upstairs and heard a thump (unclear if patient fell or if he dropped something). Niece heard her grandmother, who was in the kitchen at the time, call out for her so she went to evaluate her uncle who was in the middle of eating dinner. Patient told his niece that he was not hungry anymore and he did not feel so good. He stated he was seeing something at the top of his eyes and then slumped over at the table.  Pt was not arousable per family, though niece states he was squeezing her hand en route in EMS. Code stroke called on arrival. CT Head negative for hemorrhage or acute infarct. While pending CTA, patient vomited and was subsequently intubated for airway protection. He was then noted to be moving all extremities and had a left upwards gaze deviation. Shortly after, was noticed to have adducted, contracted and rigid RUE as well as episode of urinary incontinence in the setting of a  tonic clonic seizure. No prior hx of seizures. In the ED, patient's tylenol and salicyclate level was wnl, Ethanol level was elevated at 246. electrolytes are wnl. Started on propofol for sedation      MICU COURSE:    Patient intubated for airway protection, sedated on propofol and midazolam. Patient was Keppra loaded and started on phenobarb PRN for alcohol withdrawal. Neurology was consulted and recommended to pursue metabolic/infectious work up, vEEG and MRI brain. Patient had an episode of low grade fever, blood cultures were obtained and are pending. endotracheal cultures are growing gram negative rods. Urine culture is negative. Patient being monitored off antibiotics. Metabolic wise, patient with no gross electrolyte disturbances, aside from bicarb 21 and ammonia level of 25. Patient is now extubated, aox3. Patient has so far required 3 pushes of phenobarb in the setting of agitation. Patient now on ativan taper. MRI brain done today, read is pending. EEG pending.       ASSESSMENT & PLAN:   59Y M with PMH DM, HTN, HLD who presents with acute onset of unresponsiveness. CT Head negative for acute pathology, likely has Status epilepticus likely secondary to alcohol intoxication    #Neuro  Alcoholic intoxication vs new onset seizures  -ethanol level in blood 246, utox negative on admission   -s/p  banana bag, will cont MVT, folic acid and thiamine  -CT head negative   -per conversation needs CT angio, given concern for basilar artery stenosis as this may be a TIA   -Pending MRI read  -pending EEG  -s/p Keppra load, monitor off antiepileptics for no  - on ativan taper    #Resp  -s/p intubation for airway protection, extubated overnight   -monitoring off antibiotics, saturating well on 2L NC      #CV  HTN  -takes enalapril 10mg at home per outpt pharmacy  -holding off antihypertensives  - monitor BPs and restart BP med as tolerated  -obtain official med rec, as rec obtained while pt intubated    #GI  --NPO for now  -get official speech and swallow  -hold PO meds for now    #ID  -no acute signs of infection, however had low grade fever overnight  -will cont to monitor off abx  -UA negative for leuk esterase and nitrates  -CXR clear  -f/u blood cultures, ET culture growing gram negative angelika  - consider starting abx if unstable or concern for asp PNA    #Renal  -SCr 0.70  -will d/c rossi  -TOV    #Heme  -Hemoglobin 13.1, stable  -continue to monitor  -had brown liquid coming from OGT, low concern for GIB  -PPI q D      #Endo  DM  -iSS and FS for now  -Hg A1c 10.6  - will cont to monitor FS      #DVT PPx  -Heparin 5000 TID          For Follow-Up:  [] culture results  [] TOV  [] speech and swallow consult  []fever curve, as currently off abx  [] clarify med rec, (med rec done when pt intubated)  [] ativan taper  [] F/u neurology recs  [] EEG   [] MRI read  [] order CTA to r/o basilar artery stenosis, per neuro request      Vital Signs Last 24 Hrs  T(C): 37.4 (10 Dec 2019 12:00), Max: 38.2 (10 Dec 2019 00:00)  T(F): 99.3 (10 Dec 2019 12:00), Max: 100.8 (10 Dec 2019 00:00)  HR: 71 (10 Dec 2019 12:00) (65 - 84)  BP: 108/75 (10 Dec 2019 12:00) (90/58 - 139/83)  BP(mean): 87 (10 Dec 2019 12:00) (68 - 97)  RR: 16 (10 Dec 2019 12:00) (13 - 22)  SpO2: 100% (10 Dec 2019 12:00) (86% - 100%)  I&O's Summary    09 Dec 2019 07:01  -  10 Dec 2019 07:00  --------------------------------------------------------  IN: 1026.2 mL / OUT: 1640 mL / NET: -613.8 mL    10 Dec 2019 07:01  -  10 Dec 2019 12:53  --------------------------------------------------------  IN: 0 mL / OUT: 50 mL / NET: -50 mL          MEDICATIONS  (STANDING):  atorvastatin 20 milliGRAM(s) Oral at bedtime  chlordiazePOXIDE   Oral   chlorhexidine 4% Liquid 1 Application(s) Topical <User Schedule>  dextrose 5%. 1000 milliLiter(s) (50 mL/Hr) IV Continuous <Continuous>  dextrose 50% Injectable 12.5 Gram(s) IV Push once  dextrose 50% Injectable 25 Gram(s) IV Push once  dextrose 50% Injectable 25 Gram(s) IV Push once  folic acid 1 milliGRAM(s) Oral daily  heparin  Injectable 5000 Unit(s) SubCutaneous every 8 hours  insulin lispro (HumaLOG) corrective regimen sliding scale   SubCutaneous every 6 hours  multivitamin 1 Tablet(s) Oral daily  pantoprazole  Injectable 40 milliGRAM(s) IV Push daily  thiamine IVPB 500 milliGRAM(s) IV Intermittent three times a day    MEDICATIONS  (PRN):  dextrose 40% Gel 15 Gram(s) Oral once PRN Blood Glucose LESS THAN 70 milliGRAM(s)/deciliter  glucagon  Injectable 1 milliGRAM(s) IntraMuscular once PRN Glucose LESS THAN 70 milligrams/deciliter  PHENobarbital Injectable 130 milliGRAM(s) IV Push every 15 minutes PRN for agitation and alcohol withdrawal        LABS                                            12.0                  Neurophils% (auto):   61.5   (12-10 @ 04:00):    8.28 )-----------(176          Lymphocytes% (auto):  29.5                                          36.8                   Eosinphils% (auto):   1.9      Manual%: Neutrophils x    ; Lymphocytes x    ; Eosinophils x    ; Bands%: x    ; Blasts x                                    137    |  105    |  11                  Calcium: 8.0   / iCa: x      (12-10 @ 04:00)    ----------------------------<  188       Magnesium: 1.8                              3.8     |  21     |  0.70             Phosphorous: 2.5      TPro  6.2    /  Alb  3.2    /  TBili  0.4    /  DBili  x      /  AST  24     /  ALT  35     /  AlkPhos  65     10 Dec 2019 04:00 MICU Transfer Note    Transfer from: MICU  Transfer to:  ( x ) Medicine    (  ) Telemetry    (  ) RCU    (  ) Palliative    (  ) Stroke Unit    (  ) _______________  Accepting physican: Dr. Rojelio HANNA COURSE:  dx: NCSE likely secondary to severe alcohol intoxication  vs now onset seizure disorder    Patient is a 59Y M with PMH DM, HTN, HLD who presents with acute onset of altered mental status. Per niece at bedside, she was upstairs and heard a thump (unclear if patient fell or if he dropped something). Niece heard her grandmother, who was in the kitchen at the time, call out for her so she went to evaluate her uncle who was in the middle of eating dinner. Patient told his niece that he was not hungry anymore and he did not feel so good. He stated he was seeing something at the top of his eyes and then slumped over at the table.  Pt was not arousable per family, though niece states he was squeezing her hand en route in EMS. Code stroke called on arrival. CT Head negative for hemorrhage or acute infarct. While pending CTA, patient vomited and was subsequently intubated for airway protection. He was then noted to be moving all extremities and had a left upwards gaze deviation. Shortly after, was noticed to have adducted, contracted and rigid RUE as well as episode of urinary incontinence in the setting of a  tonic clonic seizure. No prior hx of seizures. In the ED, patient's tylenol and salicyclate level was wnl, Ethanol level was elevated at 246. electrolytes are wnl. Started on propofol for sedation      MICU COURSE:    Patient intubated for airway protection, sedated on propofol and midazolam. Patient was Keppra loaded and started on phenobarb PRN for alcohol withdrawal. Neurology was consulted and recommended to pursue metabolic/infectious work up, vEEG and MRI brain. Patient had an episode of low grade fever, blood cultures were obtained and are pending. endotracheal cultures are growing gram negative rods. Urine culture is negative. Patient being monitored off antibiotics. Metabolic wise, patient with no gross electrolyte disturbances, aside from bicarb 21 and ammonia level of 25. Patient is now extubated, aox3. Patient has so far required 3 pushes of phenobarb in the setting of agitation. Patient now on ativan taper. MRI brain done today, read is pending. EEG pending.       ASSESSMENT & PLAN:   59Y M with PMH DM, HTN, HLD who presents with acute onset of unresponsiveness. CT Head negative for acute pathology, likely has Status epilepticus likely secondary to alcohol intoxication    #Neuro  Alcoholic intoxication vs new onset seizures  -ethanol level in blood 246, utox negative on admission   -s/p  banana bag, will cont MVT, folic acid and thiamine  -CT head negative   -per conversation needs CT angio, given concern for basilar artery stenosis as this may be a TIA   -Pending MRI read  -EEG completed   -s/p Keppra load, monitor off antiepileptics for no  - on ativan taper    #Resp  -s/p intubation for airway protection, extubated overnight   -monitoring off antibiotics, saturating well on 2L NC      #CV  HTN  -takes enalapril 10mg at home per outpt pharmacy  -holding off antihypertensives  - monitor BPs and restart BP med as tolerated  -obtain official med rec, as rec obtained while pt intubated    #GI  --NPO for now  -get official speech and swallow  -hold PO meds for now    #ID  -no acute signs of infection, however had low grade fever overnight  -will cont to monitor off abx  -UA negative for leuk esterase and nitrates  -CXR clear  -f/u blood cultures, ET culture growing gram negative angelika  - consider starting abx if unstable or concern for asp PNA    #Renal  -SCr 0.70  -will d/c rossi  -TOV    #Heme  -Hemoglobin 13.1, stable  -continue to monitor  -had brown liquid coming from OGT, low concern for GIB  -PPI q D      #Endo  DM  -iSS and FS for now  -Hg A1c 10.6  - will cont to monitor FS      #DVT PPx  -Heparin 5000 TID          For Follow-Up:  [] culture results  [] TOV  [] speech and swallow consult  []fever curve, as currently off abx  [] clarify med rec, (med rec done when pt intubated)  [] ativan taper  [] F/u neurology recs  [] EEG read (Done already)  [] MRI read  []  CTA to r/o basilar artery stenosis, per neuro request      Vital Signs Last 24 Hrs  T(C): 37.4 (10 Dec 2019 12:00), Max: 38.2 (10 Dec 2019 00:00)  T(F): 99.3 (10 Dec 2019 12:00), Max: 100.8 (10 Dec 2019 00:00)  HR: 71 (10 Dec 2019 12:00) (65 - 84)  BP: 108/75 (10 Dec 2019 12:00) (90/58 - 139/83)  BP(mean): 87 (10 Dec 2019 12:00) (68 - 97)  RR: 16 (10 Dec 2019 12:00) (13 - 22)  SpO2: 100% (10 Dec 2019 12:00) (86% - 100%)  I&O's Summary    09 Dec 2019 07:01  -  10 Dec 2019 07:00  --------------------------------------------------------  IN: 1026.2 mL / OUT: 1640 mL / NET: -613.8 mL    10 Dec 2019 07:01  -  10 Dec 2019 12:53  --------------------------------------------------------  IN: 0 mL / OUT: 50 mL / NET: -50 mL          MEDICATIONS  (STANDING):  atorvastatin 20 milliGRAM(s) Oral at bedtime  chlordiazePOXIDE   Oral   chlorhexidine 4% Liquid 1 Application(s) Topical <User Schedule>  dextrose 5%. 1000 milliLiter(s) (50 mL/Hr) IV Continuous <Continuous>  dextrose 50% Injectable 12.5 Gram(s) IV Push once  dextrose 50% Injectable 25 Gram(s) IV Push once  dextrose 50% Injectable 25 Gram(s) IV Push once  folic acid 1 milliGRAM(s) Oral daily  heparin  Injectable 5000 Unit(s) SubCutaneous every 8 hours  insulin lispro (HumaLOG) corrective regimen sliding scale   SubCutaneous every 6 hours  multivitamin 1 Tablet(s) Oral daily  pantoprazole  Injectable 40 milliGRAM(s) IV Push daily  thiamine IVPB 500 milliGRAM(s) IV Intermittent three times a day    MEDICATIONS  (PRN):  dextrose 40% Gel 15 Gram(s) Oral once PRN Blood Glucose LESS THAN 70 milliGRAM(s)/deciliter  glucagon  Injectable 1 milliGRAM(s) IntraMuscular once PRN Glucose LESS THAN 70 milligrams/deciliter  PHENobarbital Injectable 130 milliGRAM(s) IV Push every 15 minutes PRN for agitation and alcohol withdrawal        LABS                                            12.0                  Neurophils% (auto):   61.5   (12-10 @ 04:00):    8.28 )-----------(176          Lymphocytes% (auto):  29.5                                          36.8                   Eosinphils% (auto):   1.9      Manual%: Neutrophils x    ; Lymphocytes x    ; Eosinophils x    ; Bands%: x    ; Blasts x                                    137    |  105    |  11                  Calcium: 8.0   / iCa: x      (12-10 @ 04:00)    ----------------------------<  188       Magnesium: 1.8                              3.8     |  21     |  0.70             Phosphorous: 2.5      TPro  6.2    /  Alb  3.2    /  TBili  0.4    /  DBili  x      /  AST  24     /  ALT  35     /  AlkPhos  65     10 Dec 2019 04:00

## 2019-12-10 NOTE — PROGRESS NOTE ADULT - SUBJECTIVE AND OBJECTIVE BOX
Neurology  Progress Note  12-10-19    Name:  SAMIR IRAHETA; 59y    Interval History:    CHIEF COMPLAINT: AMS    HPI  Patient is a 59Y M with PMH DM, HTN, HLD who presents with acute onset of altered mental status. Patient was not arousable per family and brought in by EMS. Code stroke called on arrival, initial NIHSS 28; pre-MRS unknown. CT Head negative for hemorrhage or acute infarct. CTA not completed, because pending CTA, patient vomited and was subsequently intubated for airway protection. He was then noted to be moving all extremities and had a left upwards gaze deviation. Shortly after, was noticed to have adducted, contracted and rigid RUE as well as episode of urinary incontinence. No convulsions noted on my exam or by family. No prior hx of seizures.   Niece endorses hx of focal seizures but is unsure of rest of family hx.     FAMILY HISTORY:    Allergies  [facility] MEDICATIONS:  MEDICATIONS  (STANDING):  chlorhexidine 0.12% Liquid 15 milliLiter(s) Oral Mucosa every 12 hours  propofol Infusion 10 MICROgram(s)/kG/Min (4.968 mL/Hr) IV Continuous <Continuous>    MEDICATIONS  (PRN): (08 Dec 2019 19:22)    REVIEW OF SYSTEMS:  As states in HPI.    Medications:  acetaminophen  IVPB .. 1000 milliGRAM(s) IV Intermittent once  atorvastatin 20 milliGRAM(s) Oral at bedtime  chlorhexidine 4% Liquid 1 Application(s) Topical <User Schedule>  dextrose 40% Gel 15 Gram(s) Oral once PRN  dextrose 5%. 1000 milliLiter(s) IV Continuous <Continuous>  dextrose 50% Injectable 12.5 Gram(s) IV Push once  dextrose 50% Injectable 25 Gram(s) IV Push once  dextrose 50% Injectable 25 Gram(s) IV Push once  etomidate Injectable 20 milliGRAM(s) IV Push Once  fentaNYL    Injectable 100 MICROGram(s) IV Push Once  fentaNYL    Injectable 100 MICROGram(s) IV Push once  folic acid 1 milliGRAM(s) Oral daily  glucagon  Injectable 1 milliGRAM(s) IntraMuscular once PRN  heparin  Injectable 5000 Unit(s) SubCutaneous every 8 hours  insulin lispro (HumaLOG) corrective regimen sliding scale   SubCutaneous every 6 hours  levETIRAcetam  IVPB 1000 milliGRAM(s) IV Intermittent once  midazolam Injectable 4 milliGRAM(s) IV Push once  midazolam Injectable 4 milliGRAM(s) IV Push once  multivitamin 1 Tablet(s) Oral daily  naloxone Injectable 0.4 milliGRAM(s) IV Push Once  pantoprazole  Injectable 40 milliGRAM(s) IV Push daily  PHENobarbital Injectable 130 milliGRAM(s) IV Push every 15 minutes PRN  sodium chloride 0.9% Bolus 500 milliLiter(s) IV Bolus once  sodium chloride 0.9% Bolus 500 milliLiter(s) IV Bolus once  succinylcholine Injectable 100 milliGRAM(s) IV Push Once  thiamine IVPB 500 milliGRAM(s) IV Intermittent three times a day    Vitals:  T(C): 36.4 (12-10-19 @ 08:00), Max: 38.2 (12-10-19 @ 00:00)  HR: 67 (12-10-19 @ 09:00) (65 - 90)  BP: 103/66 (12-10-19 @ 09:00) (90/58 - 119/75)  RR: 13 (12-10-19 @ 09:00) (13 - 22)  SpO2: 98% (12-10-19 @ 09:00) (86% - 100%)    Labs:                        12.0   8.28  )-----------( 176      ( 10 Dec 2019 04:00 )             36.8     12-10    137  |  105  |  11  ----------------------------<  188<H>  3.8   |  21<L>  |  0.70    Ca    8.0<L>      10 Dec 2019 04:00  Phos  2.5     12-10  Mg     1.8     12-10    TPro  6.2  /  Alb  3.2<L>  /  TBili  0.4  /  DBili  x   /  AST  24  /  ALT  35  /  AlkPhos  65  12-10    CAPILLARY BLOOD GLUCOSE      POCT Blood Glucose.: 178 mg/dL (10 Dec 2019 05:12)    LIVER FUNCTIONS - ( 10 Dec 2019 04:00 )  Alb: 3.2 g/dL / Pro: 6.2 g/dL / ALK PHOS: 65 u/L / ALT: 35 u/L / AST: 24 u/L / GGT: x             Culture - Urine (collected 08 Dec 2019 19:10)  Source: URINE CATHETER  Final Report (10 Dec 2019 08:34):    NO GROWTH AT 24 HOURS    PT/INR - ( 08 Dec 2019 17:45 )   PT: 10.0 SEC;   INR: 0.90        PTT - ( 08 Dec 2019 17:45 )  PTT:29.6 SEC    PHYSICAL EXAMINATION:  General: Well-developed, well nourished, in no acute distress.  Eyes: Conjunctiva and sclera clear.  Neurologic:  - Mental Status:  Alert, awake, oriented to person, place, and time; Speech is fluent with intact naming, repetition, and comprehension; Good overall fund of knowledge; Immediate recall is 3/3 words and delayed recall is 3/3 words at 5 minutes; Able to spell WORLD backwards and perform serial 7 subtraction; Able to read and write a sentence.  - Cranial Nerves II-XII:    II:  Visual fields are full to confronation; Pupils are equal, round, and reactive to light; Visual acuity is 20/20 bilaterally; Fundoscopic exam is normal with sharp discs.  III, IV, VI:  Extraocular movements are intact without nystagmus.  V:  Facial sensation is intact in the V1-V3 distribution bilaterally.  VII:  Face is symmetric with normal eye closure and smile  VIII:  Hearing is intact to finger rub.  IX, X:  Uvula is midline and soft palate rises symmetrically  XI:  Head turning and shoulder shrug are intact.  XII:  Tongue protudes in the midline.  - Motor:  Strength is 5/5 throughout.  There is no prontator drift.  Normal muscle bulk and tone throughout.  - Reflexes:  2+ and symmetric at the biceps, triceps, brachioradialis, knees, and ankles.  Plantar responses flexor.  - Sensory:  Intact throughout to vibration, and joint-position, light touch, pin prick.  - Coordination:  Finger-nose-finger and heel-knee-shin intact without dysmetria.  Rapid alternating hand movements intact.  - Gait:   Normal steps, base, arm swing, and turning.  Heel and toe walking are normal.  Tandem gait is normal.  Romberg testing is negative.    Radiology:  EXAM:  CT BRAIN STROKE PROTOCOL        PROCEDURE DATE:  Dec  8 2019         INTERPRETATION:  HISTORY: Unresponsive    COMPARISON: None    TECHNIQUE: Axial noncontrast CT images from the skull base to the vertex   were obtained and submitted for interpretation. Coronal and sagittal   reformatted images were performed. Bone and soft tissue windows were   evaluated.    FINDINGS: There is no acute intracranial mass-effect, hemorrhage, midline   shift, or abnormal extra-axial fluid collection.   Basal cisterns are patent.     The ventricles, and sulci are normal in size for the patient's age.     Paranasal sinuses and mastoid air cells are clear. Calvarium is intact.     IMPRESSION: No acute intracranial hemorrhage, mass effect, or shift of   the midline structures.     These findings were discussed with Dr. Phoenix at 12/8/2019 5:50 PM by   Dr. Fleming with read back confirmation.               YEIMI FLEMING M.D., RADIOLOGY RESIDENT  This document has been electronically signed.  ADRIANNA FABIAN M.D.,ATTENDING RADIOLOGIST  This document has been electronically signed. Dec  8 2019  5:55PM Neurology  Progress Note  12-10-19    Name:  SAMIR IRAHETA; 59y    Interval History: Pt seen at bedside with attending in NAD. Offers no new neurological complaints.     HPI  Patient is a 59Y M with PMH DM, HTN, HLD who presents with acute onset of altered mental status. Patient was not arousable per family and brought in by EMS. Code stroke called on arrival, initial NIHSS 28; pre-MRS unknown. CT Head negative for hemorrhage or acute infarct. CTA not completed, because pending CTA, patient vomited and was subsequently intubated for airway protection. He was then noted to be moving all extremities and had a left upwards gaze deviation. Shortly after, was noticed to have adducted, contracted and rigid RUE as well as episode of urinary incontinence. No convulsions noted on my exam or by family. No prior hx of seizures.   Niece endorses hx of focal seizures but is unsure of rest of family hx.     FAMILY HISTORY:    Allergies  [facility] MEDICATIONS:  MEDICATIONS  (STANDING):  chlorhexidine 0.12% Liquid 15 milliLiter(s) Oral Mucosa every 12 hours  propofol Infusion 10 MICROgram(s)/kG/Min (4.968 mL/Hr) IV Continuous <Continuous>    MEDICATIONS  (PRN): (08 Dec 2019 19:22)    REVIEW OF SYSTEMS:  As states in [facility].    Medications:  acetaminophen  IVPB .. 1000 milliGRAM(s) IV Intermittent once  atorvastatin 20 milliGRAM(s) Oral at bedtime  chlorhexidine 4% Liquid 1 Application(s) Topical <User Schedule>  dextrose 40% Gel 15 Gram(s) Oral once PRN  dextrose 5%. 1000 milliLiter(s) IV Continuous <Continuous>  dextrose 50% Injectable 12.5 Gram(s) IV Push once  dextrose 50% Injectable 25 Gram(s) IV Push once  dextrose 50% Injectable 25 Gram(s) IV Push once  etomidate Injectable 20 milliGRAM(s) IV Push Once  fentaNYL    Injectable 100 MICROGram(s) IV Push Once  fentaNYL    Injectable 100 MICROGram(s) IV Push once  folic acid 1 milliGRAM(s) Oral daily  glucagon  Injectable 1 milliGRAM(s) IntraMuscular once PRN  heparin  Injectable 5000 Unit(s) SubCutaneous every 8 hours  insulin lispro (HumaLOG) corrective regimen sliding scale   SubCutaneous every 6 hours  levETIRAcetam  IVPB 1000 milliGRAM(s) IV Intermittent once  midazolam Injectable 4 milliGRAM(s) IV Push once  midazolam Injectable 4 milliGRAM(s) IV Push once  multivitamin 1 Tablet(s) Oral daily  naloxone Injectable 0.4 milliGRAM(s) IV Push Once  pantoprazole  Injectable 40 milliGRAM(s) IV Push daily  PHENobarbital Injectable 130 milliGRAM(s) IV Push every 15 minutes PRN  sodium chloride 0.9% Bolus 500 milliLiter(s) IV Bolus once  sodium chloride 0.9% Bolus 500 milliLiter(s) IV Bolus once  succinylcholine Injectable 100 milliGRAM(s) IV Push Once  thiamine IVPB 500 milliGRAM(s) IV Intermittent three times a day    Vitals:  T(C): 36.4 (12-10-19 @ 08:00), Max: 38.2 (12-10-19 @ 00:00)  HR: 67 (12-10-19 @ 09:00) (65 - 90)  BP: 103/66 (12-10-19 @ 09:00) (90/58 - 119/75)  RR: 13 (12-10-19 @ 09:00) (13 - 22)  SpO2: 98% (12-10-19 @ 09:00) (86% - 100%)    Labs:                        12.0   8.28  )-----------( 176      ( 10 Dec 2019 04:00 )             36.8     12-10    137  |  105  |  11  ----------------------------<  188<H>  3.8   |  21<L>  |  0.70    Ca    8.0<L>      10 Dec 2019 04:00  Phos  2.5     12-10  Mg     1.8     12-10    TPro  6.2  /  Alb  3.2<L>  /  TBili  0.4  /  DBili  x   /  AST  24  /  ALT  35  /  AlkPhos  65  12-10    CAPILLARY BLOOD GLUCOSE      POCT Blood Glucose.: 178 mg/dL (10 Dec 2019 05:12)    LIVER FUNCTIONS - ( 10 Dec 2019 04:00 )  Alb: 3.2 g/dL / Pro: 6.2 g/dL / ALK PHOS: 65 u/L / ALT: 35 u/L / AST: 24 u/L / GGT: x             Culture - Urine (collected 08 Dec 2019 19:10)  Source: URINE CATHETER  Final Report (10 Dec 2019 08:34):    NO GROWTH AT 24 HOURS    PT/INR - ( 08 Dec 2019 17:45 )   PT: 10.0 SEC;   INR: 0.90        PTT - ( 08 Dec 2019 17:45 )  PTT:29.6 SEC    PHYSICAL EXAMINATION:  General: see attending attestation      Radiology:  EXAM:  CT BRAIN STROKE PROTOCOL        PROCEDURE DATE:  Dec  8 2019         INTERPRETATION:  HISTORY: Unresponsive    COMPARISON: None    TECHNIQUE: Axial noncontrast CT images from the skull base to the vertex   were obtained and submitted for interpretation. Coronal and sagittal   reformatted images were performed. Bone and soft tissue windows were   evaluated.    FINDINGS: There is no acute intracranial mass-effect, hemorrhage, midline   shift, or abnormal extra-axial fluid collection.   Basal cisterns are patent.     The ventricles, and sulci are normal in size for the patient's age.     Paranasal sinuses and mastoid air cells are clear. Calvarium is intact.     IMPRESSION: No acute intracranial hemorrhage, mass effect, or shift of   the midline structures.

## 2019-12-10 NOTE — PATIENT PROFILE ADULT - STATED REASON FOR ADMISSION
Pt was unresponsive at this kitchen table  - per niece Pt admitted to MICU. Arrival to 8N on 12/10/19. Profile not completed in MICU Pt was unresponsive at  kitchen table  - per niece Pt admitted to MICU. Arrival to 8N on 12/10/19. Profile not completed on admission

## 2019-12-10 NOTE — PROGRESS NOTE ADULT - ASSESSMENT
59Y M with PMH DM, HTN, HLD who presents with acute onset of unresponsiveness. CT Head negative for acute pathology, likely has Status epilepticus likely secondary to alcohol intoxication    #Neuro  Alcoholic intoxication vs new onset seizures  -ethanol level in blood 246, utox negative on admission   -s/p  banana bag, will cont MVT, folic acid and thiamine  -CT head negative   -per conversation needs CT angio, given concern for basilar artery stenosis as this may be a TIA   -Pending MRI read  -pending EEG  -s/p Keppra load, monitor off antiepileptics for no  - on ativan taper    #Resp  -s/p intubation for airway protection, extubated overnight   -monitoring off antibiotics, saturating well on 2L NC    #CV  HTN  -takes enalapril 10mg at home per outpt pharmacy  -holding off antihypertensives  - monitor BPs and restart BP med as tolerated  -obtain official med rec, as rec obtained while pt intubated    #GI  --NPO for now  -get official speech and swallow    #ID  -no acute signs of infection, however had low grade fever overnight  -will cont to monitor off abx  -UA negative for leuk esterase and nitrates  -CXR clear  -f/u blood cultures, ET culture growing gram negative angelika  - consider starting abx if unstable or concern for asp PNA    #Renal  -SCr 0.70  -will d/c rossi  -TOV    #Heme  -Hemoglobin 13.1, stable  -continue to monitor  -had brown liquid coming from OGT, low concern for GIB  -PPI q D      #Endo  DM  -iSS and FS for now  -Hg A1c 10.6  - will cont to monitor FS    #DVT PPx  -Heparin 5000 TID

## 2019-12-10 NOTE — PROGRESS NOTE ADULT - ASSESSMENT
Patient is a 59Y M with PMH DM, HTN, HLD who presents with acute onset of altered mental status. Code stroke called on arrival, initial NIHSS 28; pre-MRS unknown. CT Head negative for hemorrhage or acute infarct.     Impression:    Plan:    Note in progress. Will discuss with Resident, Dr. Gutierres, PGY3  Link Mustafa, MS3 Patient is a 59Y M with PMH DM, HTN, HLD who presents with acute onset of altered mental status. Code stroke called on arrival, initial NIHSS 28; pre-MRS unknown. CT Head negative for hemorrhage or acute infarct.     Impression: Unclear etiology of AMS and his initial presentation:  DDx: seizure ETOH intoxication w/ seizure, hepatic encephalopathy    Plan:  [x] MRI Brain WAW - unremarkable  [] CTA Head and Neck to assess posterior circulation   [] check folate, b12, methylmalonic acid, homcysteine, TSH, A1c  [] Monitor off AEDs as could be provoked event  [] c/w thiamine/folate replacement  []  for ETOH cessation  [] IV Ativan 2mg PRN only for GTC seizure lasting >5 minutes (GTC seizure usually accompanies change in vitals, pupillary dilation Patient is a 59Y M with PMH DM, HTN, HLD who presents with acute onset of altered mental status. Code stroke called on arrival, initial NIHSS 28; pre-MRS unknown. CT Head negative for hemorrhage or acute infarct.     Impression: Unclear etiology of AMS and his initial presentation:  DDx: seizure ETOH intoxication w/ seizure, hepatic encephalopathy    Plan:    [x] MRI Brain WAW - unremarkable  [] CTA Head and Neck to assess posterior circulation and basilar artery  [] check folate, b12, methylmalonic acid, homocysteine, TSH, A1c  [] Monitor off AEDs as could be provoked event  [] c/w thiamine/folate replacement  []  for ETOH cessation  [] IV Ativan 2mg PRN only for GTC seizure lasting >5 minutes (GTC seizure usually accompanies change in vitals, pupillary dilation

## 2019-12-10 NOTE — PROGRESS NOTE ADULT - ATTENDING COMMENTS
Patient intubated yesterday for airway protection, elevated etoh level, extubated this morning.  Doing well, able to respond to commands, speaks normally, just has sore throat. f/u MRI results.  Will start ativan taper for possible etoh withdrawal.  Holding off on abx - clinically does not appear infected.

## 2019-12-10 NOTE — PROGRESS NOTE ADULT - SUBJECTIVE AND OBJECTIVE BOX
CHIEF COMPLAINT: Patient is a 59y old  Male who presents with a chief complaint of AMS (09 Dec 2019 07:41)    Interval Events:    REVIEW OF SYSTEMS:  Constitutional:   Eyes:  ENT:  CV:  Resp:  GI:  :  MSK:  Integumentary:  Neurological:  Psychiatric:  Endocrine:  Hematologic/Lymphatic:  Allergic/Immunologic:  [ ] All other systems negative  [ ] Unable to assess ROS because ________    OBJECTIVE:  ICU Vital Signs Last 24 Hrs  T(C): 36.4 (10 Dec 2019 08:00), Max: 38.2 (10 Dec 2019 00:00)  T(F): 97.5 (10 Dec 2019 08:00), Max: 100.8 (10 Dec 2019 00:00)  HR: 67 (10 Dec 2019 09:) (65 - 90)  BP: 103/66 (10 Dec 2019 09:) (90/58 - 119/75)  BP(mean): 75 (10 Dec 2019 09:) (68 - 90)  ABP: --  ABP(mean): --  RR: 13 (10 Dec 2019 09:) (13 - 22)  SpO2: 98% (10 Dec 2019 09:) (86% - 100%)    Mode: CPAP with PS, FiO2: 40, PEEP: 5, PS: 5, MAP: 7, PIP: 10     @ :01  -  12-10 @ 07:00  --------------------------------------------------------  IN: 1026.2 mL / OUT: 1640 mL / NET: -613.8 mL    12-10 @ 07:01  -  12-10 @ 09:50  --------------------------------------------------------  IN: 0 mL / OUT: 50 mL / NET: -50 mL      CAPILLARY BLOOD GLUCOSE      POCT Blood Glucose.: 178 mg/dL (10 Dec 2019 05:12)      PHYSICAL EXAM:  General:   HEENT:   Lymph Nodes:  Neck:   Respiratory:   Cardiovascular:   Abdomen:   Extremities:   Skin:   Neurological:  Psychiatry:    HOSPITAL MEDICATIONS:  MEDICATIONS  (STANDING):  atorvastatin 20 milliGRAM(s) Oral at bedtime  chlorhexidine 4% Liquid 1 Application(s) Topical <User Schedule>  dextrose 5%. 1000 milliLiter(s) (50 mL/Hr) IV Continuous <Continuous>  dextrose 50% Injectable 12.5 Gram(s) IV Push once  dextrose 50% Injectable 25 Gram(s) IV Push once  dextrose 50% Injectable 25 Gram(s) IV Push once  folic acid 1 milliGRAM(s) Oral daily  heparin  Injectable 5000 Unit(s) SubCutaneous every 8 hours  insulin lispro (HumaLOG) corrective regimen sliding scale   SubCutaneous every 6 hours  multivitamin 1 Tablet(s) Oral daily  pantoprazole  Injectable 40 milliGRAM(s) IV Push daily  thiamine IVPB 500 milliGRAM(s) IV Intermittent three times a day    MEDICATIONS  (PRN):  dextrose 40% Gel 15 Gram(s) Oral once PRN Blood Glucose LESS THAN 70 milliGRAM(s)/deciliter  glucagon  Injectable 1 milliGRAM(s) IntraMuscular once PRN Glucose LESS THAN 70 milligrams/deciliter  PHENobarbital Injectable 130 milliGRAM(s) IV Push every 15 minutes PRN for agitation and alcohol withdrawal      LABS:  (12-10 @ 04:00)                        12.0  8.28 )-----------( 176                 36.8    Neutrophils = 5.09 (61.5%)  Lymphocytes = 2.44 (29.5%)  Eosinophils = 0.16 (1.9%)  Basophils = 0.02 (0.2%)  Monocytes = 0.54 (6.5%)  Bands = --%    WBC Trend: 8.28<--, 8.82<--, 8.42<--  Hb Trend: 12.0<--, 12.7<--, 13.1<--  Plt Trend: 176<--, 234<--, 215<--  12-10    137  |  105  |  11  ----------------------------<  188<H>  3.8   |  21<L>  |  0.70    Ca    8.0<L>      10 Dec 2019 04:00  Phos  2.5     12-10  Mg     1.8     12-10    TPro  6.2  /  Alb  3.2<L>  /  TBili  0.4  /  DBili  x   /  AST  24  /  ALT  35  /  AlkPhos  65  12-10    Creatinine Trend: 0.70<--, 0.68<--, 0.77<--  PT/INR - ( 08 Dec 2019 17:45 )   PT: 10.0 SEC;   INR: 0.90          PTT - ( 08 Dec 2019 17:45 )  PTT:29.6 SEC  Urinalysis Basic - ( 08 Dec 2019 18:51 )    Color: LIGHT YELLOW / Appearance: CLEAR / S.016 / pH: 6.0  Gluc: NEGATIVE / Ketone: SMALL  / Bili: NEGATIVE / Urobili: NORMAL   Blood: NEGATIVE / Protein: 200 / Nitrite: NEGATIVE   Leuk Esterase: NEGATIVE / RBC: 3-5 / WBC 0-2   Sq Epi: OCC / Non Sq Epi: x / Bacteria: NEGATIVE      Arterial Blood Gas:  12-10 @ 04:00  7.40/39/207/24/99.7/-0.3  ABG lactate: 1.1  Arterial Blood Gas:   @ 04:05  7.31/42/137/21/98.8/-4.5  ABG lactate: 4.8  Arterial Blood Gas:   @ 23:10  7.26/46/203/19/99.1/-6.1  ABG lactate: 5.1    Venous Blood Gas:   @ 03:00  7.30/41/104/19/97.3  VBG Lactate: 5.3  Venous Blood Gas:   @ 17:45  7.31/49/27/21/38.2  VBG Lactate: 3.9    CARDIAC MARKERS ( 08 Dec 2019 17:45 )  Trop x     /  u/L<H> / CKMB 2.81 ng/mL         MICROBIOLOGY:   Blood Cx:  Urine Cx:  Sputum Cx:  Legionella:  RVP:    RADIOLOGY:  X Ray:  CT:  MRI:  Ultrasound:  [ ] Reviewed and interpreted by me    EKG: CHIEF COMPLAINT: Patient is a 59y old  Male who presents with a chief complaint of AMS (09 Dec 2019 07:41)    Interval Events: Patient febrile to 100.8 F, blood and sputum cx and RVP were sent. Patient also required to doses of phenobarb o/n for agitation.     REVIEW OF SYSTEMS:  Constitutional:   Eyes:  ENT:  CV:  Resp:  GI:  :  MSK:  Integumentary:  Neurological:  Psychiatric:  Endocrine:  Hematologic/Lymphatic:  Allergic/Immunologic:  [ ] All other systems negative  [x ] Unable to assess ROS because patient unable to speak    OBJECTIVE:  ICU Vital Signs Last 24 Hrs  T(C): 36.4 (10 Dec 2019 08:00), Max: 38.2 (10 Dec 2019 00:00)  T(F): 97.5 (10 Dec 2019 08:00), Max: 100.8 (10 Dec 2019 00:00)  HR: 67 (10 Dec 2019 09:) (65 - 90)  BP: 103/66 (10 Dec 2019 09:00) (90/58 - 119/75)  BP(mean): 75 (10 Dec 2019 09:) (68 - 90)  ABP: --  ABP(mean): --  RR: 13 (10 Dec 2019 09:00) (13 - 22)  SpO2: 98% (10 Dec 2019 09:) (86% - 100%)    Mode: CPAP with PS, FiO2: 40, PEEP: 5, PS: 5, MAP: 7, PIP: 10     @ 07:  -  12-10 @ 07:00  --------------------------------------------------------  IN: 1026.2 mL / OUT: 1640 mL / NET: -613.8 mL    12-10 @ 07:  -  12-10 @ 09:50  --------------------------------------------------------  IN: 0 mL / OUT: 50 mL / NET: -50 mL      CAPILLARY BLOOD GLUCOSE      POCT Blood Glucose.: 178 mg/dL (10 Dec 2019 05:12)      PHYSICAL EXAM:  General: male lying comfortably in bed  HEENT: anicteric sclerae  Neck: supple  Respiratory: extubated, CTA b/l no rales ronchi wheezing  Cardiovascular: normal s1 s2 no gallops rubs or mumur  Abdomen: soft non distended   Extremities: no swelling in LEs  Neurological: awake alert following commands answering questions appropriately    HOSPITAL MEDICATIONS:  MEDICATIONS  (STANDING):  atorvastatin 20 milliGRAM(s) Oral at bedtime  chlorhexidine 4% Liquid 1 Application(s) Topical <User Schedule>  dextrose 5%. 1000 milliLiter(s) (50 mL/Hr) IV Continuous <Continuous>  dextrose 50% Injectable 12.5 Gram(s) IV Push once  dextrose 50% Injectable 25 Gram(s) IV Push once  dextrose 50% Injectable 25 Gram(s) IV Push once  folic acid 1 milliGRAM(s) Oral daily  heparin  Injectable 5000 Unit(s) SubCutaneous every 8 hours  insulin lispro (HumaLOG) corrective regimen sliding scale   SubCutaneous every 6 hours  multivitamin 1 Tablet(s) Oral daily  pantoprazole  Injectable 40 milliGRAM(s) IV Push daily  thiamine IVPB 500 milliGRAM(s) IV Intermittent three times a day    MEDICATIONS  (PRN):  dextrose 40% Gel 15 Gram(s) Oral once PRN Blood Glucose LESS THAN 70 milliGRAM(s)/deciliter  glucagon  Injectable 1 milliGRAM(s) IntraMuscular once PRN Glucose LESS THAN 70 milligrams/deciliter  PHENobarbital Injectable 130 milliGRAM(s) IV Push every 15 minutes PRN for agitation and alcohol withdrawal      LABS:  (12-10 @ 04:00)                        12.0  8.28 )-----------( 176                 36.8    Neutrophils = 5.09 (61.5%)  Lymphocytes = 2.44 (29.5%)  Eosinophils = 0.16 (1.9%)  Basophils = 0.02 (0.2%)  Monocytes = 0.54 (6.5%)  Bands = --%    WBC Trend: 8.28<--, 8.82<--, 8.42<--  Hb Trend: 12.0<--, 12.7<--, 13.1<--  Plt Trend: 176<--, 234<--, 215<--  12-10    137  |  105  |  11  ----------------------------<  188<H>  3.8   |  21<L>  |  0.70    Ca    8.0<L>      10 Dec 2019 04:00  Phos  2.5     12-10  Mg     1.8     12-10    TPro  6.2  /  Alb  3.2<L>  /  TBili  0.4  /  DBili  x   /  AST  24  /  ALT  35  /  AlkPhos  65  12-10    Creatinine Trend: 0.70<--, 0.68<--, 0.77<--  PT/INR - ( 08 Dec 2019 17:45 )   PT: 10.0 SEC;   INR: 0.90          PTT - ( 08 Dec 2019 17:45 )  PTT:29.6 SEC  Urinalysis Basic - ( 08 Dec 2019 18:51 )    Color: LIGHT YELLOW / Appearance: CLEAR / S.016 / pH: 6.0  Gluc: NEGATIVE / Ketone: SMALL  / Bili: NEGATIVE / Urobili: NORMAL   Blood: NEGATIVE / Protein: 200 / Nitrite: NEGATIVE   Leuk Esterase: NEGATIVE / RBC: 3-5 / WBC 0-2   Sq Epi: OCC / Non Sq Epi: x / Bacteria: NEGATIVE      Arterial Blood Gas:  12-10 @ 04:00  7.40/39/207/24/99.7/-0.3  ABG lactate: 1.1  Arterial Blood Gas:   @ 04:05  7.31/42/137/21/98.8/-4.5  ABG lactate: 4.8  Arterial Blood Gas:   @ 23:10  7.26/46/203/19/99.1/-6.1  ABG lactate: 5.1    Venous Blood Gas:   @ 03:00  7.30/41/104/19/97.3  VBG Lactate: 5.3  Venous Blood Gas:   @ 17:45  7.31/49/27/21/38.2  VBG Lactate: 3.9    CARDIAC MARKERS ( 08 Dec 2019 17:45 )  Trop x     /  u/L<H> / CKMB 2.81 ng/mL

## 2019-12-10 NOTE — EEG REPORT - NS EEG TEXT BOX
[facility]   COMPREHENSIVE EPILEPSY CENTER   REPORT OF ROUTINE EEG W/ Video     [facility]: [address], [place] [address], Ph#: [phone]  [facility]: [address], [place] [address], Ph#: [phone]  H: [address] [place] [address], Ph#: [phone]    Patient Name: SAMIR IRAHETA  Age and : 59y ([birth year])  MRN #: [id number]  Location: [place] [address]  Referring Physician: Savannah Cervantes    Study Date: 12-10-19    _____________________________________________________________  TECHNICAL INFORMATION    Placement and Labeling of Electrodes:  The EEG was performed utilizing 20 channels referential EEG connections (coronal over temporal over parasagittal montage) using all standard 10-20 electrode placements with EKG.  Recording was at a sampling rate of 256 samples per second per channel.  Time synchronized digital video recording was done simultaneously with EEG recording.  A low light infrared camera was used for low light recording.  Maynor and seizure detection algorithms were utilized.    _____________________________________________________________  HISTORY    Patient is a 59y old  Male who presents with a chief complaint of AMS (10 Dec 2019 09:58)      PERTINENT MEDICATION:  LORazepam   Injectable   IV Push   LORazepam   Injectable 4 milliGRAM(s) IV Push every 4 hours  _____________________________________________________________  STUDY INTERPRETATION    Findings: The background was continuous, spontaneously variable and reactive. During wakefulness, the posterior dominant rhythm consisted of symmetric, well-modulated 9 Hz activity, with amplitude to 20 uV, that attenuated to eye opening.  Low amplitude frontal beta was noted in wakefulness.    Background Slowing:  Intermittent diffuse polymorphic delta slowing.    Focal Slowing:   None were present.    Sleep Background:  Drowsiness and stage II sleep transients were not recorded.    Other Non-Epileptiform Findings:  None were present.    Interictal Epileptiform Activity:   None were present.    Events:  Clinical events: None recorded.  Seizures: None recorded.    Activation Procedures:   Hyperventilation was not performed.    Photic stimulation was performed and did not elicit any abnormality.     Artifacts:  Intermittent myogenic and movement artifacts were noted.    ECG:  The heart rate on single channel ECG was predominantly between 60-70 BPM.    _____________________________________________________________  EEG SUMMARY/CLASSIFICATION    Abnormal EEG in the awake state.  - Mild generalized slowing.    _____________________________________________________________  EEG IMPRESSION/CLINICAL CORRELATE    Abnormal EEG study.  1. Mild nonspecific diffuse or multifocal cerebral dysfunction.   2. No epileptiform pattern or seizure seen.    Preliminary Fellow Report  _____________________________________________________________    Maggy Calle MD  Epilepsy Fellow    Pratik De MD  Attending Physician, [facility] Epilepsy [facility]   COMPREHENSIVE EPILEPSY CENTER   REPORT OF ROUTINE EEG W/ Video     [facility]: [address], [place] [address], Ph#: [phone]  [facility]: [address], [place] [address], Ph#: [phone]  [facility]: [address] [place] [address], Ph#: [phone]    Patient Name: SAMIR IRAHETA  Age and : 59y ([birth year])  MRN #: [id number]  Location: [place] [address]  Referring Physician: Savannah Cervantes    Study Date: 12-10-19    _____________________________________________________________  TECHNICAL INFORMATION    Placement and Labeling of Electrodes:  The EEG was performed utilizing 20 channels referential EEG connections (coronal over temporal over parasagittal montage) using all standard 10-20 electrode placements with EKG.  Recording was at a sampling rate of 256 samples per second per channel.  Time synchronized digital video recording was done simultaneously with EEG recording.  A low light infrared camera was used for low light recording.  Maynor and seizure detection algorithms were utilized.    _____________________________________________________________  HISTORY    Patient is a 59y old  Male who presents with a chief complaint of AMS (10 Dec 2019 09:58)      PERTINENT MEDICATION:  LORazepam   Injectable   IV Push   LORazepam   Injectable 4 milliGRAM(s) IV Push every 4 hours  _____________________________________________________________  STUDY INTERPRETATION    Findings: The background was continuous, spontaneously variable and reactive. During wakefulness, the posterior dominant rhythm consisted of symmetric, well-modulated 9 Hz activity, with amplitude to 20 uV, that attenuated to eye opening.  Low amplitude frontal beta was noted in wakefulness.    Background Slowing:  Intermittent diffuse polymorphic delta slowing.    Focal Slowing:   Intermittent delta slowing in the temporal regions bilaterally, right > left.    Sleep Background:  Drowsiness and stage II sleep transients were not recorded.    Other Non-Epileptiform Findings:  None were present.    Interictal Epileptiform Activity:   None were present.    Events:  Clinical events: None recorded.  Seizures: None recorded.    Activation Procedures:   Hyperventilation was not performed.    Photic stimulation was performed and did not elicit any abnormality.     Artifacts:  Intermittent myogenic and movement artifacts were noted.    ECG:  The heart rate on single channel ECG was predominantly between 60-70 BPM.    _____________________________________________________________  EEG SUMMARY/CLASSIFICATION    Abnormal EEG in the awake state.  - Mild generalized slowing.  Intermittent delta slowing in the temporal regions bilaterally, right > left.    _____________________________________________________________  EEG IMPRESSION/CLINICAL CORRELATE    Abnormal EEG study.  1. Mild nonspecific diffuse or multifocal cerebral dysfunction.   2. Functional abnormality in the temporal regions, right > left.  3. No epileptiform pattern or seizure seen.    _____________________________________________________________    Maggy Calle MD  Epilepsy Fellow    Pratik De MD  Attending Physician, [facility] Epilepsy [place]

## 2019-12-10 NOTE — CHART NOTE - NSCHARTNOTEFT_GEN_A_CORE
Transfer from: MICU  Transfer to:  ( x ) Medicine    (  ) Telemetry    (  ) RCU    (  ) Palliative    (  ) Stroke Unit    (  ) _______________  Accepting physican: Dr. Serrato      [facility]  dx: NCSE likely secondary to severe alcohol intoxication  vs now onset seizure disorder    Patient is a 59Y M with PMH DM, HTN, HLD who presents with acute onset of altered mental status. Code stroke called on arrival. CT Head negative for hemorrhage or acute infarct. While pending CTA, patient vomited and was subsequently intubated for airway protection. He was then noted to be moving all extremities and had a left upwards gaze deviation. Shortly after, was noticed to have adducted, contracted and rigid RUE as well as episode of urinary incontinence in the setting of a  tonic clonic seizure. Patient intubated for airway protection, sedated on propofol and midazolam. Patient was Keppra loaded and started on phenobarb PRN for alcohol withdrawal. Neurology was consulted and recommended to pursue metabolic/infectious work up, vEEG and MRI brain. Patient had an episode of low grade fever, blood cultures were obtained and are pending. endotracheal cultures are growing gram negative rods. Urine culture is negative. Patient being monitored off antibiotics. Metabolic wise, patient with no gross electrolyte disturbances, aside from bicarb 21 and ammonia level of 25. Patient is now extubated, aox3. Patient has so far required 3 pushes of phenobarb in the setting of agitation. Patient now on ativan taper. MRI brain done today, read is pending. EEG pending.       ASSESSMENT & PLAN:   59Y M with PMH DM, HTN, HLD who presents with acute onset of unresponsiveness. CT Head negative for acute pathology, likely has Status epilepticus likely secondary to alcohol intoxication    #Neuro  Alcoholic intoxication vs new onset seizures  -ethanol level in blood 246, utox negative on admission   -s/p  banana bag, will cont MVT, folic acid and thiamine  -CT head negative   -per conversation needs CT angio, given concern for basilar artery stenosis as this may be a TIA   -Pending MRI read  -EEG completed   -s/p Keppra load, monitor off antiepileptics for no  - on ativan taper    #Resp  -s/p intubation for airway protection, extubated overnight   -monitoring off antibiotics, saturating well on 2L NC      #CV  HTN  -takes enalapril 10mg at home per outpt pharmacy  -holding off antihypertensives  - monitor BPs and restart BP med as tolerated  -obtain official med rec, as rec obtained while pt intubated    #GI  --NPO for now  -get official speech and swallow  -hold PO meds for now    #ID  -no acute signs of infection, however had low grade fever overnight  -will cont to monitor off abx  -UA negative for leuk esterase and nitrates  -CXR clear  -f/u blood cultures, ET culture growing gram negative angelika  - consider starting abx if unstable or concern for asp PNA    #Renal  -SCr 0.70  -will d/c rossi  -TOV    #Heme  -Hemoglobin 13.1, stable  -continue to monitor  -had brown liquid coming from OGT, low concern for GIB  -PPI q D      #Endo  DM  -iSS and FS for now  -Hg A1c 10.6  - will cont to monitor FS      #DVT PPx  -Heparin 5000 TID          For Follow-Up:  [] culture results  [] TOV  [] speech and swallow consult  []fever curve, as currently off abx  [] clarify med rec, (med rec done when pt intubated)  [] ativan taper  [] F/u neurology recs  [] EEG read (Done already)  [] MRI read  []  CTA to r/o basilar artery stenosis, per neuro request

## 2019-12-10 NOTE — PROGRESS NOTE ADULT - ATTENDING COMMENTS
Patient seen and examined with neurology resident and above note reviewed and I agree with assessment and plan as outlined. Patient extubated and now awake, alert and oriented. His exam is much improved today and no focal cranial nerve or motor deficits.   MRI brain reviewed and no acute findings and awaiting EEG official report.  Continue MICU care and alcohol counseling and behavioral health evaluation.  Await blood work ordered above and continue supportive care.

## 2019-12-11 LAB
ALBUMIN SERPL ELPH-MCNC: 3.2 G/DL — LOW (ref 3.3–5)
ALP SERPL-CCNC: 65 U/L — SIGNIFICANT CHANGE UP (ref 40–120)
ALT FLD-CCNC: 29 U/L — SIGNIFICANT CHANGE UP (ref 4–41)
ANION GAP SERPL CALC-SCNC: 16 MMO/L — HIGH (ref 7–14)
AST SERPL-CCNC: 26 U/L — SIGNIFICANT CHANGE UP (ref 4–40)
BASOPHILS # BLD AUTO: 0.03 K/UL — SIGNIFICANT CHANGE UP (ref 0–0.2)
BASOPHILS NFR BLD AUTO: 0.4 % — SIGNIFICANT CHANGE UP (ref 0–2)
BILIRUB SERPL-MCNC: 0.5 MG/DL — SIGNIFICANT CHANGE UP (ref 0.2–1.2)
BUN SERPL-MCNC: 7 MG/DL — SIGNIFICANT CHANGE UP (ref 7–23)
CALCIUM SERPL-MCNC: 8.1 MG/DL — LOW (ref 8.4–10.5)
CHLORIDE SERPL-SCNC: 105 MMOL/L — SIGNIFICANT CHANGE UP (ref 98–107)
CO2 SERPL-SCNC: 20 MMOL/L — LOW (ref 22–31)
CREAT SERPL-MCNC: 0.66 MG/DL — SIGNIFICANT CHANGE UP (ref 0.5–1.3)
EOSINOPHIL # BLD AUTO: 0.26 K/UL — SIGNIFICANT CHANGE UP (ref 0–0.5)
EOSINOPHIL NFR BLD AUTO: 3.9 % — SIGNIFICANT CHANGE UP (ref 0–6)
FOLATE SERPL-MCNC: 17.2 NG/ML — SIGNIFICANT CHANGE UP (ref 4.7–20)
GLUCOSE BLDC GLUCOMTR-MCNC: 133 MG/DL — HIGH (ref 70–99)
GLUCOSE BLDC GLUCOMTR-MCNC: 142 MG/DL — HIGH (ref 70–99)
GLUCOSE BLDC GLUCOMTR-MCNC: 165 MG/DL — HIGH (ref 70–99)
GLUCOSE SERPL-MCNC: 125 MG/DL — HIGH (ref 70–99)
HBA1C BLD-MCNC: 10.6 % — HIGH (ref 4–5.6)
HCT VFR BLD CALC: 37.1 % — LOW (ref 39–50)
HCYS SERPL-MCNC: 6 UMOL/L — SIGNIFICANT CHANGE UP
HGB BLD-MCNC: 12.4 G/DL — LOW (ref 13–17)
IMM GRANULOCYTES NFR BLD AUTO: 0.1 % — SIGNIFICANT CHANGE UP (ref 0–1.5)
LYMPHOCYTES # BLD AUTO: 2.12 K/UL — SIGNIFICANT CHANGE UP (ref 1–3.3)
LYMPHOCYTES # BLD AUTO: 31.4 % — SIGNIFICANT CHANGE UP (ref 13–44)
MAGNESIUM SERPL-MCNC: 1.8 MG/DL — SIGNIFICANT CHANGE UP (ref 1.6–2.6)
MCHC RBC-ENTMCNC: 27.3 PG — SIGNIFICANT CHANGE UP (ref 27–34)
MCHC RBC-ENTMCNC: 33.4 % — SIGNIFICANT CHANGE UP (ref 32–36)
MCV RBC AUTO: 81.5 FL — SIGNIFICANT CHANGE UP (ref 80–100)
MONOCYTES # BLD AUTO: 0.46 K/UL — SIGNIFICANT CHANGE UP (ref 0–0.9)
MONOCYTES NFR BLD AUTO: 6.8 % — SIGNIFICANT CHANGE UP (ref 2–14)
NEUTROPHILS # BLD AUTO: 3.87 K/UL — SIGNIFICANT CHANGE UP (ref 1.8–7.4)
NEUTROPHILS NFR BLD AUTO: 57.4 % — SIGNIFICANT CHANGE UP (ref 43–77)
NRBC # FLD: 0 K/UL — SIGNIFICANT CHANGE UP (ref 0–0)
PHOSPHATE SERPL-MCNC: 2.7 MG/DL — SIGNIFICANT CHANGE UP (ref 2.5–4.5)
PLATELET # BLD AUTO: 215 K/UL — SIGNIFICANT CHANGE UP (ref 150–400)
PMV BLD: 11.2 FL — SIGNIFICANT CHANGE UP (ref 7–13)
POTASSIUM SERPL-MCNC: 3.2 MMOL/L — LOW (ref 3.5–5.3)
POTASSIUM SERPL-SCNC: 3.2 MMOL/L — LOW (ref 3.5–5.3)
PROT SERPL-MCNC: 6.4 G/DL — SIGNIFICANT CHANGE UP (ref 6–8.3)
RBC # BLD: 4.55 M/UL — SIGNIFICANT CHANGE UP (ref 4.2–5.8)
RBC # FLD: 12.4 % — SIGNIFICANT CHANGE UP (ref 10.3–14.5)
SODIUM SERPL-SCNC: 141 MMOL/L — SIGNIFICANT CHANGE UP (ref 135–145)
SPECIMEN SOURCE: SIGNIFICANT CHANGE UP
SPECIMEN SOURCE: SIGNIFICANT CHANGE UP
TSH SERPL-MCNC: 1.42 UIU/ML — SIGNIFICANT CHANGE UP (ref 0.27–4.2)
VIT B12 SERPL-MCNC: 1009 PG/ML — HIGH (ref 200–900)
WBC # BLD: 6.75 K/UL — SIGNIFICANT CHANGE UP (ref 3.8–10.5)
WBC # FLD AUTO: 6.75 K/UL — SIGNIFICANT CHANGE UP (ref 3.8–10.5)

## 2019-12-11 PROCEDURE — 70496 CT ANGIOGRAPHY HEAD: CPT | Mod: 26

## 2019-12-11 PROCEDURE — 99233 SBSQ HOSP IP/OBS HIGH 50: CPT

## 2019-12-11 PROCEDURE — 70498 CT ANGIOGRAPHY NECK: CPT | Mod: 26

## 2019-12-11 RX ORDER — POTASSIUM CHLORIDE 20 MEQ
10 PACKET (EA) ORAL
Refills: 0 | Status: COMPLETED | OUTPATIENT
Start: 2019-12-11 | End: 2019-12-11

## 2019-12-11 RX ADMIN — SODIUM CHLORIDE 75 MILLILITER(S): 9 INJECTION, SOLUTION INTRAVENOUS at 00:08

## 2019-12-11 RX ADMIN — Medication 100 MILLIEQUIVALENT(S): at 14:32

## 2019-12-11 RX ADMIN — Medication 4 MILLIGRAM(S): at 02:32

## 2019-12-11 RX ADMIN — Medication 1: at 22:41

## 2019-12-11 RX ADMIN — Medication 3 MILLIGRAM(S): at 14:32

## 2019-12-11 RX ADMIN — Medication 4 MILLIGRAM(S): at 10:03

## 2019-12-11 RX ADMIN — HEPARIN SODIUM 5000 UNIT(S): 5000 INJECTION INTRAVENOUS; SUBCUTANEOUS at 14:32

## 2019-12-11 RX ADMIN — Medication 100 MILLIEQUIVALENT(S): at 11:23

## 2019-12-11 RX ADMIN — Medication 4 MILLIGRAM(S): at 06:55

## 2019-12-11 RX ADMIN — Medication 105 MILLIGRAM(S): at 22:30

## 2019-12-11 RX ADMIN — ATORVASTATIN CALCIUM 20 MILLIGRAM(S): 80 TABLET, FILM COATED ORAL at 22:30

## 2019-12-11 RX ADMIN — HEPARIN SODIUM 5000 UNIT(S): 5000 INJECTION INTRAVENOUS; SUBCUTANEOUS at 22:30

## 2019-12-11 RX ADMIN — Medication 105 MILLIGRAM(S): at 06:55

## 2019-12-11 RX ADMIN — Medication 100 MILLIEQUIVALENT(S): at 10:04

## 2019-12-11 RX ADMIN — Medication 105 MILLIGRAM(S): at 14:33

## 2019-12-11 RX ADMIN — SODIUM CHLORIDE 75 MILLILITER(S): 9 INJECTION, SOLUTION INTRAVENOUS at 14:29

## 2019-12-11 RX ADMIN — HEPARIN SODIUM 5000 UNIT(S): 5000 INJECTION INTRAVENOUS; SUBCUTANEOUS at 06:55

## 2019-12-11 RX ADMIN — Medication 3 MILLIGRAM(S): at 22:30

## 2019-12-11 NOTE — SWALLOW BEDSIDE ASSESSMENT ADULT - PHARYNGEAL PHASE
Nilda Leavitt [facility]M [facility]             This patient has not been seen since 01/2019. Can you call to see if she is seeking care elsewhere? Otherwise, she needs to be seen ASAP for prenatal care/testing.   Nilda Leavitt CNM, [facility]        Following up on [employer] staff message above. I called patient regarding prenatal care and she has transferred her care over to [facility] due to insurance.    Altaf Galvez, CMA      Within functional limits

## 2019-12-11 NOTE — PROGRESS NOTE ADULT - SUBJECTIVE AND OBJECTIVE BOX
Patient is a 59y old  Male who presents with a chief complaint of AMS (11 Dec 2019 10:10)      SUBJECTIVE / OVERNIGHT EVENTS:  patient seen with nP  Ox2 after ativan.  notes he has no wife or children   sister is his family.  Explained he was in iCU and alesha to be alive- he is encourage to stop drinking- will send b6 level    MEDICATIONS  (STANDING):  atorvastatin 20 milliGRAM(s) Oral at bedtime  dextrose 5% + sodium chloride 0.45%. 1000 milliLiter(s) (75 mL/Hr) IV Continuous <Continuous>  dextrose 5%. 1000 milliLiter(s) (50 mL/Hr) IV Continuous <Continuous>  dextrose 50% Injectable 12.5 Gram(s) IV Push once  dextrose 50% Injectable 25 Gram(s) IV Push once  dextrose 50% Injectable 25 Gram(s) IV Push once  folic acid 1 milliGRAM(s) Oral daily  heparin  Injectable 5000 Unit(s) SubCutaneous every 8 hours  influenza   Vaccine 0.5 milliLiter(s) IntraMuscular once  insulin lispro (HumaLOG) corrective regimen sliding scale   SubCutaneous every 6 hours  LORazepam   Injectable 3 milliGRAM(s) IV Push every 4 hours  LORazepam   Injectable   IV Push   multivitamin 1 Tablet(s) Oral daily  pantoprazole    Tablet 40 milliGRAM(s) Oral before breakfast  potassium chloride  10 mEq/100 mL IVPB 10 milliEquivalent(s) IV Intermittent every 1 hour  thiamine IVPB 500 milliGRAM(s) IV Intermittent three times a day    MEDICATIONS  (PRN):  benzocaine 15 mG/menthol 3.6 mG (Sugar-Free) Lozenge 1 Lozenge Oral every 4 hours PRN Sore Throat  dextrose 40% Gel 15 Gram(s) Oral once PRN Blood Glucose LESS THAN 70 milliGRAM(s)/deciliter  glucagon  Injectable 1 milliGRAM(s) IntraMuscular once PRN Glucose LESS THAN 70 milligrams/deciliter          POCT Blood Glucose.: 181 mg/dL (11 Dec 2019 09:07)  POCT Blood Glucose.: 115 mg/dL (11 Dec 2019 06:34)  POCT Blood Glucose.: 103 mg/dL (10 Dec 2019 23:16)  POCT Blood Glucose.: 76 mg/dL (10 Dec 2019 17:28)    I&O's Summary    10 Dec 2019 07:01  -  11 Dec 2019 07:00  --------------------------------------------------------  IN: 0 mL / OUT: 625 mL / NET: -625 mL        PHYSICAL EXAM:  GENERAL: NAD, well-developed  HEAD:  Atraumatic, Normocephalic  EYES: EOMI, PERRLA, conjunctiva and sclera clear  NECK: Supple, No JVD  CHEST/LUNG: Clear to auscultation bilaterally; No wheeze  HEART: Regular rate and rhythm; No murmurs, rubs, or gallops  ABDOMEN: Soft, Nontender, Nondistended; Bowel sounds present  EXTREMITIES:  2+ Peripheral Pulses, No clubbing, cyanosis, or edema  PSYCH: AAOx2  NEUROLOGY: non-focal  SKIN: No rashes or lesions    LABS:                        12.4   6.75  )-----------( 215      ( 11 Dec 2019 06:40 )             37.1     12-11    141  |  105  |  7   ----------------------------<  125<H>  3.2<L>   |  20<L>  |  0.66    Ca    8.1<L>      11 Dec 2019 06:40  Phos  2.7     12-11  Mg     1.8     12-11    TPro  6.4  /  Alb  3.2<L>  /  TBili  0.5  /  DBili  x   /  AST  26  /  ALT  29  /  AlkPhos  65  12-11        RADIOLOGY & ADDITIONAL TESTS:  < from: CT Angio Head w/ IV Cont (12.11.19 @ 09:46) >  Head CT:    No acute intracranial abnormality is noted.    CTA head and neck:    No evidence for significant intracranial or intracranial arterial   stenosis.    < end of copied text >      Imaging Personally Reviewed:    Consultant(s) Notes Reviewed:      Care Discussed with Consultants/Other Providers:

## 2019-12-11 NOTE — SWALLOW BEDSIDE ASSESSMENT ADULT - SWALLOW EVAL: DIAGNOSIS
1. Functional oral phase for puree consistency, solids, honey thick liquids, nectar thick liquids and thin liquids marked by mildly extended mastication though able to breakdown bolus, adequate bolus manipulation and adequate anterior to posterior transport 2. Functional pharyngeal phase for all consistencies marked by adequate laryngeal elevation upon palpation and NO overt s/s of laryngeal penetration/aspiration noted

## 2019-12-11 NOTE — PROGRESS NOTE ADULT - SUBJECTIVE AND OBJECTIVE BOX
Neurology  Progress Note  12-11-19    Name:  SAMIR IRAHETA; 59y    Interval History:     HPI  Patient is a 59Y M with PMH DM, HTN, HLD who presents with acute onset of altered mental status. Patient was not arousable per family and brought in by EMS. Code stroke called on arrival, initial NIHSS 28; pre-MRS unknown. CT Head negative for hemorrhage or acute infarct. CTA not completed, because pending CTA, patient vomited and was subsequently intubated for airway protection. He was then noted to be moving all extremities and had a left upwards gaze deviation. Shortly after, was noticed to have adducted, contracted and rigid RUE as well as episode of urinary incontinence. No convulsions noted on my exam or by family. No prior hx of seizures.   Niece endorses hx of focal seizures but is unsure of rest of family hx.     FAMILY HISTORY:    Allergies  NKA    MEDICATIONS  (STANDING):  atorvastatin 20 milliGRAM(s) Oral at bedtime  dextrose 5% + sodium chloride 0.45%. 1000 milliLiter(s) (75 mL/Hr) IV Continuous <Continuous>  dextrose 5%. 1000 milliLiter(s) (50 mL/Hr) IV Continuous <Continuous>  dextrose 50% Injectable 12.5 Gram(s) IV Push once  dextrose 50% Injectable 25 Gram(s) IV Push once  dextrose 50% Injectable 25 Gram(s) IV Push once  folic acid 1 milliGRAM(s) Oral daily  heparin  Injectable 5000 Unit(s) SubCutaneous every 8 hours  influenza   Vaccine 0.5 milliLiter(s) IntraMuscular once  insulin lispro (HumaLOG) corrective regimen sliding scale   SubCutaneous every 6 hours  LORazepam   Injectable 4 milliGRAM(s) IV Push every 4 hours  LORazepam   Injectable 3 milliGRAM(s) IV Push every 4 hours  LORazepam   Injectable   IV Push   multivitamin 1 Tablet(s) Oral daily  pantoprazole    Tablet 40 milliGRAM(s) Oral before breakfast  thiamine IVPB 500 milliGRAM(s) IV Intermittent three times a day    MEDICATIONS  (PRN):  benzocaine 15 mG/menthol 3.6 mG (Sugar-Free) Lozenge 1 Lozenge Oral every 4 hours PRN Sore Throat  dextrose 40% Gel 15 Gram(s) Oral once PRN Blood Glucose LESS THAN 70 milliGRAM(s)/deciliter  glucagon  Injectable 1 milliGRAM(s) IntraMuscular once PRN Glucose LESS THAN 70 milligrams/deciliter      Vital Signs (24 Hrs):  T(C): 36.7 (12-11-19 @ 06:52), Max: 37.4 (12-10-19 @ 12:00)  HR: 87 (12-11-19 @ 06:52) (67 - 87)  BP: 129/71 (12-11-19 @ 06:52) (101/67 - 139/83)  RR: 18 (12-11-19 @ 06:52) (13 - 20)  SpO2: 100% (12-11-19 @ 06:52) (96% - 100%)  Wt(kg): --  Daily     Daily     I&O's Summary    10 Dec 2019 07:01  -  11 Dec 2019 07:00  --------------------------------------------------------  IN: 0 mL / OUT: 625 mL / NET: -625 mL        Labs:                        12.0   8.28  )-----------( 176      ( 10 Dec 2019 04:00 )             36.8     12-10    137  |  105  |  11  ----------------------------<  188<H>  3.8   |  21<L>  |  0.70    Ca    8.0<L>      10 Dec 2019 04:00  Phos  2.5     12-10  Mg     1.8     12-10    TPro  6.2  /  Alb  3.2<L>  /  TBili  0.4  /  DBili  x   /  AST  24  /  ALT  35  /  AlkPhos  65  12-10    CAPILLARY BLOOD GLUCOSE      POCT Blood Glucose.: 178 mg/dL (10 Dec 2019 05:12)    LIVER FUNCTIONS - ( 10 Dec 2019 04:00 )  Alb: 3.2 g/dL / Pro: 6.2 g/dL / ALK PHOS: 65 u/L / ALT: 35 u/L / AST: 24 u/L / GGT: x             Culture - Urine (collected 08 Dec 2019 19:10)  Source: URINE CATHETER  Final Report (10 Dec 2019 08:34):    NO GROWTH AT 24 HOURS    PT/INR - ( 08 Dec 2019 17:45 )   PT: 10.0 SEC;   INR: 0.90        PTT - ( 08 Dec 2019 17:45 )  PTT:29.6 SEC    PHYSICAL EXAMINATION:  General: see attending attestation      Radiology:  EXAM:  CT BRAIN STROKE PROTOCOL        PROCEDURE DATE:  Dec  8 2019         INTERPRETATION:  HISTORY: Unresponsive    COMPARISON: None    TECHNIQUE: Axial noncontrast CT images from the skull base to the vertex   were obtained and submitted for interpretation. Coronal and sagittal   reformatted images were performed. Bone and soft tissue windows were   evaluated.    FINDINGS: There is no acute intracranial mass-effect, hemorrhage, midline   shift, or abnormal extra-axial fluid collection.   Basal cisterns are patent.     The ventricles, and sulci are normal in size for the patient's age.     Paranasal sinuses and mastoid air cells are clear. Calvarium is intact.     IMPRESSION: No acute intracranial hemorrhage, mass effect, or shift of   the midline structures.

## 2019-12-11 NOTE — SWALLOW BEDSIDE ASSESSMENT ADULT - SWALLOW EVAL: RECOMMENDED FEEDING/EATING TECHNIQUES
maintain upright posture during/after eating for 30 mins/oral hygiene/position upright (90 degrees)/small sips/bites

## 2019-12-11 NOTE — PROGRESS NOTE ADULT - ASSESSMENT
Patient is a 59Y M with PMH DM, HTN, HLD who presents with acute onset of altered mental status. Code stroke called on arrival, initial NIHSS 28; pre-MRS unknown. CT Head negative for hemorrhage or acute infarct. EEG with structural abnormality R>L but no seizure activity    Impression: Unclear etiology of AMS and his initial presentation:  DDx: seizure ETOH intoxication w/ seizure, hepatic encephalopathy    Plan(incomplete):  [] c/w thiamine/folate replacement  []  for ETOH cessation  [] IV Ativan 2mg PRN only for GTC seizure lasting >5 minutes (GTC seizure usually accompanies change in vitals, pupillary dilation

## 2019-12-11 NOTE — PROGRESS NOTE ADULT - ATTENDING COMMENTS
Mri of Brain- neg for CVA  CTA of h/n- P  Ciwa and slow ativan nito Mri of Brain- neg for CVA  CTA of h/n- neg  Ciwa and slow ativan nito  b 6 level Mri of Brain- neg for CVA  CTA of h/n- neg  Ciwa and slow ativan nito  b 6 level  Sister tel #  [phone]

## 2019-12-11 NOTE — PROVIDER CONTACT NOTE (MEDICATION) - ASSESSMENT
Pt is awake and alert, with episodes of lethargy. Pt reports feeling lightheaded. No s/s of distress. No c/o of pain. VSS. Will continue to monitor.

## 2019-12-11 NOTE — SWALLOW BEDSIDE ASSESSMENT ADULT - COMMENTS
SLP reviewed chart, went to floor to administer evaluation. SLP spoke with RN who reported patient was off the floor for a CT scan and reported patient was being transferred to [facility] after.  SLP will attempt evaluation again when schedule permits.
Patient is a "59Y M with PMH DM, HTN, HLD who presents with acute onset of unresponsiveness. CT Head negative for acute pathology, likely has Status epilepticus likely secondary to alcohol intoxication". MRI completed on 12/10 was "unremarkable". CXR completed on 12/8 reported "clear lungs". Patient intubated on 12/9 and extubated on 12/10.     Patient was seen upright at bedside. Patient was alert/awake and verbally responsive to simple questions. Patient was able to follow simple directions.

## 2019-12-12 DIAGNOSIS — F10.921 ALCOHOL USE, UNSPECIFIED WITH INTOXICATION DELIRIUM: ICD-10-CM

## 2019-12-12 DIAGNOSIS — R41.82 ALTERED MENTAL STATUS, UNSPECIFIED: ICD-10-CM

## 2019-12-12 DIAGNOSIS — I10 ESSENTIAL (PRIMARY) HYPERTENSION: ICD-10-CM

## 2019-12-12 DIAGNOSIS — E11.9 TYPE 2 DIABETES MELLITUS WITHOUT COMPLICATIONS: ICD-10-CM

## 2019-12-12 DIAGNOSIS — R56.9 UNSPECIFIED CONVULSIONS: ICD-10-CM

## 2019-12-12 DIAGNOSIS — Z29.9 ENCOUNTER FOR PROPHYLACTIC MEASURES, UNSPECIFIED: ICD-10-CM

## 2019-12-12 LAB
-  AMIKACIN: SIGNIFICANT CHANGE UP
-  AMPICILLIN/SULBACTAM: SIGNIFICANT CHANGE UP
-  AMPICILLIN: SIGNIFICANT CHANGE UP
-  AZTREONAM: SIGNIFICANT CHANGE UP
-  CEFAZOLIN: SIGNIFICANT CHANGE UP
-  CEFEPIME: SIGNIFICANT CHANGE UP
-  CEFOXITIN: SIGNIFICANT CHANGE UP
-  CEFTAZIDIME: SIGNIFICANT CHANGE UP
-  CEFTRIAXONE: SIGNIFICANT CHANGE UP
-  ERTAPENEM: SIGNIFICANT CHANGE UP
-  GENTAMICIN: SIGNIFICANT CHANGE UP
-  IMIPENEM: SIGNIFICANT CHANGE UP
-  LEVOFLOXACIN: SIGNIFICANT CHANGE UP
-  MEROPENEM: SIGNIFICANT CHANGE UP
-  PIPERACILLIN/TAZOBACTAM: SIGNIFICANT CHANGE UP
-  TIGECYCLINE: SIGNIFICANT CHANGE UP
-  TOBRAMYCIN: SIGNIFICANT CHANGE UP
-  TRIMETHOPRIM/SULFAMETHOXAZOLE: SIGNIFICANT CHANGE UP
ANION GAP SERPL CALC-SCNC: 14 MMO/L — SIGNIFICANT CHANGE UP (ref 7–14)
BACTERIA SPT RESP CULT: SIGNIFICANT CHANGE UP
BUN SERPL-MCNC: 7 MG/DL — SIGNIFICANT CHANGE UP (ref 7–23)
CALCIUM SERPL-MCNC: 8.3 MG/DL — LOW (ref 8.4–10.5)
CHLORIDE SERPL-SCNC: 105 MMOL/L — SIGNIFICANT CHANGE UP (ref 98–107)
CO2 SERPL-SCNC: 21 MMOL/L — LOW (ref 22–31)
CREAT SERPL-MCNC: 0.67 MG/DL — SIGNIFICANT CHANGE UP (ref 0.5–1.3)
GLUCOSE BLDC GLUCOMTR-MCNC: 136 MG/DL — HIGH (ref 70–99)
GLUCOSE BLDC GLUCOMTR-MCNC: 145 MG/DL — HIGH (ref 70–99)
GLUCOSE BLDC GLUCOMTR-MCNC: 152 MG/DL — HIGH (ref 70–99)
GLUCOSE BLDC GLUCOMTR-MCNC: 178 MG/DL — HIGH (ref 70–99)
GLUCOSE BLDC GLUCOMTR-MCNC: 209 MG/DL — HIGH (ref 70–99)
GLUCOSE SERPL-MCNC: 161 MG/DL — HIGH (ref 70–99)
GRAM STN SPT: SIGNIFICANT CHANGE UP
HCT VFR BLD CALC: 35 % — LOW (ref 39–50)
HGB BLD-MCNC: 11.2 G/DL — LOW (ref 13–17)
MCHC RBC-ENTMCNC: 26.4 PG — LOW (ref 27–34)
MCHC RBC-ENTMCNC: 32 % — SIGNIFICANT CHANGE UP (ref 32–36)
MCV RBC AUTO: 82.4 FL — SIGNIFICANT CHANGE UP (ref 80–100)
METHOD TYPE: SIGNIFICANT CHANGE UP
NRBC # FLD: 0 K/UL — SIGNIFICANT CHANGE UP (ref 0–0)
ORGANISM # SPEC MICROSCOPIC CNT: SIGNIFICANT CHANGE UP
ORGANISM # SPEC MICROSCOPIC CNT: SIGNIFICANT CHANGE UP
PLATELET # BLD AUTO: 214 K/UL — SIGNIFICANT CHANGE UP (ref 150–400)
PMV BLD: 11.5 FL — SIGNIFICANT CHANGE UP (ref 7–13)
POTASSIUM SERPL-MCNC: 3.2 MMOL/L — LOW (ref 3.5–5.3)
POTASSIUM SERPL-SCNC: 3.2 MMOL/L — LOW (ref 3.5–5.3)
RBC # BLD: 4.25 M/UL — SIGNIFICANT CHANGE UP (ref 4.2–5.8)
RBC # FLD: 12.7 % — SIGNIFICANT CHANGE UP (ref 10.3–14.5)
SODIUM SERPL-SCNC: 140 MMOL/L — SIGNIFICANT CHANGE UP (ref 135–145)
WBC # BLD: 6.67 K/UL — SIGNIFICANT CHANGE UP (ref 3.8–10.5)
WBC # FLD AUTO: 6.67 K/UL — SIGNIFICANT CHANGE UP (ref 3.8–10.5)

## 2019-12-12 PROCEDURE — 99233 SBSQ HOSP IP/OBS HIGH 50: CPT

## 2019-12-12 RX ORDER — THIAMINE MONONITRATE (VIT B1) 100 MG
500 TABLET ORAL EVERY 8 HOURS
Refills: 0 | Status: COMPLETED | OUTPATIENT
Start: 2019-12-12 | End: 2019-12-15

## 2019-12-12 RX ORDER — POTASSIUM CHLORIDE 20 MEQ
10 PACKET (EA) ORAL
Refills: 0 | Status: COMPLETED | OUTPATIENT
Start: 2019-12-12 | End: 2019-12-12

## 2019-12-12 RX ORDER — POTASSIUM CHLORIDE 20 MEQ
40 PACKET (EA) ORAL EVERY 4 HOURS
Refills: 0 | Status: COMPLETED | OUTPATIENT
Start: 2019-12-12 | End: 2019-12-12

## 2019-12-12 RX ORDER — INSULIN GLARGINE 100 [IU]/ML
10 INJECTION, SOLUTION SUBCUTANEOUS AT BEDTIME
Refills: 0 | Status: DISCONTINUED | OUTPATIENT
Start: 2019-12-12 | End: 2019-12-16

## 2019-12-12 RX ORDER — POTASSIUM CHLORIDE 20 MEQ
40 PACKET (EA) ORAL EVERY 4 HOURS
Refills: 0 | Status: DISCONTINUED | OUTPATIENT
Start: 2019-12-12 | End: 2019-12-12

## 2019-12-12 RX ADMIN — Medication 105 MILLIGRAM(S): at 22:28

## 2019-12-12 RX ADMIN — Medication 1 TABLET(S): at 12:30

## 2019-12-12 RX ADMIN — PANTOPRAZOLE SODIUM 40 MILLIGRAM(S): 20 TABLET, DELAYED RELEASE ORAL at 06:06

## 2019-12-12 RX ADMIN — Medication 40 MILLIEQUIVALENT(S): at 10:15

## 2019-12-12 RX ADMIN — Medication 3 MILLIGRAM(S): at 10:16

## 2019-12-12 RX ADMIN — Medication 100 MILLIEQUIVALENT(S): at 14:46

## 2019-12-12 RX ADMIN — Medication 1 MILLIGRAM(S): at 12:30

## 2019-12-12 RX ADMIN — Medication 2: at 12:30

## 2019-12-12 RX ADMIN — Medication 2 MILLIGRAM(S): at 14:46

## 2019-12-12 RX ADMIN — Medication 100 MILLIEQUIVALENT(S): at 10:19

## 2019-12-12 RX ADMIN — Medication 1: at 08:38

## 2019-12-12 RX ADMIN — ATORVASTATIN CALCIUM 20 MILLIGRAM(S): 80 TABLET, FILM COATED ORAL at 22:29

## 2019-12-12 RX ADMIN — Medication 40 MILLIEQUIVALENT(S): at 14:46

## 2019-12-12 RX ADMIN — SODIUM CHLORIDE 75 MILLILITER(S): 9 INJECTION, SOLUTION INTRAVENOUS at 04:11

## 2019-12-12 RX ADMIN — Medication 3 MILLIGRAM(S): at 05:55

## 2019-12-12 RX ADMIN — Medication 2 MILLIGRAM(S): at 22:29

## 2019-12-12 RX ADMIN — Medication 3 MILLIGRAM(S): at 02:10

## 2019-12-12 RX ADMIN — Medication 100 MILLIEQUIVALENT(S): at 12:28

## 2019-12-12 RX ADMIN — HEPARIN SODIUM 5000 UNIT(S): 5000 INJECTION INTRAVENOUS; SUBCUTANEOUS at 05:55

## 2019-12-12 RX ADMIN — HEPARIN SODIUM 5000 UNIT(S): 5000 INJECTION INTRAVENOUS; SUBCUTANEOUS at 22:28

## 2019-12-12 RX ADMIN — HEPARIN SODIUM 5000 UNIT(S): 5000 INJECTION INTRAVENOUS; SUBCUTANEOUS at 14:46

## 2019-12-12 RX ADMIN — Medication 2 MILLIGRAM(S): at 17:35

## 2019-12-12 RX ADMIN — BENZOCAINE AND MENTHOL 1 LOZENGE: 5; 1 LIQUID ORAL at 22:38

## 2019-12-12 RX ADMIN — Medication 40 MILLIEQUIVALENT(S): at 17:35

## 2019-12-12 RX ADMIN — INSULIN GLARGINE 10 UNIT(S): 100 INJECTION, SOLUTION SUBCUTANEOUS at 22:28

## 2019-12-12 NOTE — PROGRESS NOTE ADULT - ASSESSMENT
59Y M with PMH DM, HTN, HLD who presents with acute onset of unresponsiveness. CT Head negative for acute pathology, likely has Status epilepticus likely secondary to alcohol intoxication    #Neuro  Alcoholic intoxication vs new onset seizures  -ethanol level in blood 246, utox negative on admission   -s/p  banana bag, will cont MVT, folic acid and thiamine  -CT head negative   -per conversation needs CT angio, given concern for basilar artery stenosis as this may be a TIA   -Pending MRI read  -pending EEG  -s/p Keppra load, monitor off antiepileptics for no  - on ativan taper    #Resp  -s/p intubation for airway protection, extubated overnight   -monitoring off antibiotics, saturating well on 2L NC    #CV  HTN  -takes enalapril 10mg at home per outpt pharmacy  -holding off antihypertensives  - monitor BPs and restart BP med as tolerated  -obtain official med rec, as rec obtained while pt intubated    #GI  --NPO for now  -get official speech and swallow    #ID  -no acute signs of infection, however had low grade fever overnight  -will cont to monitor off abx  -UA negative for leuk esterase and nitrates  -CXR clear  -f/u blood cultures, ET culture growing gram negative angelika  - consider starting abx if unstable or concern for asp PNA    #Renal  -SCr 0.70  -will d/c rossi  -TOV    #Heme  -Hemoglobin 13.1, stable  -continue to monitor  -had brown liquid coming from OGT, low concern for GIB  -PPI q D      #Endo  DM  -iSS and FS for now  -Hg A1c 10.6  - will cont to monitor FS    #DVT PPx  -Heparin 5000 TID 59Y M with PMH DM, HTN, HLD who presents with acute onset of unresponsiveness. CT Head negative for acute pathology, likely has Status epilepticus likely secondary to alcohol intoxication.  ICU STAY with Intubation and extubation and transferred to floor 12/11/19  Yony  cognitive impairment - Ox2- ? sec to Wernicke's encephalopathy ??- f/u vit B6 level  S/s 12/11- soft solid- thin liquids  Neuro- to f/u  R upper Ext dopler- R/o DVT 59Y M with PMH DM, HTN, HLD who presents with acute onset of unresponsiveness. CT Head negative for acute pathology, likely has Status epilepticus likely secondary to alcohol intoxication.  ICU STAY with Intubation and extubation and transferred to floor 12/11/19  [place]  cognitive impairment - Ox2- ? sec to Wernicke's encephalopathy ??- f/u vit B1 level- start vit B1 iv x 3days  S/s 12/11- soft solid- thin liquids  Neuro- to f/u  R upper Ext dopler- R/o DVT 59Y M with PMH DM, HTN, HLD who presents with acute onset of unresponsiveness. CT Head negative for acute pathology, likely has Status epilepticus likely secondary to alcohol intoxication.  ICU STAY with Intubation and extubation and transferred to floor 12/11/19  Ciwa  cognitive impairment - Ox2- ? sec to Wernicke's encephalopathy ??- f/u vit B1 level- start vit B1 iv x 3days  S/s 12/11- soft solid- thin liquids  Neuro- to f/u  R upper Ext dopler- R/o DVT- doppler ord- IVL  Hypokalemia- supplemented

## 2019-12-12 NOTE — PROGRESS NOTE ADULT - PROBLEM SELECTOR PLAN 4
Pending MRI read  -pending EEG  -s/p Keppra load, monitor off antiepileptics for no  - on ativan taper head  MRI 12./10- neg  -pending EEG  -s/p Keppra load, monitor off antiepileptics for no  - on ativan taper

## 2019-12-12 NOTE — PROGRESS NOTE ADULT - PROBLEM SELECTOR PLAN 3
fs with ss coverage fs with ss coverage  fs ovr 200.  hyperglycemia- hgb a1c 10.6 on admission.  start lantus 10 u qhs- patient is alert and eating.

## 2019-12-12 NOTE — PROGRESS NOTE ADULT - PROBLEM SELECTOR PLAN 2
Alcoholic intoxication vs new onset seizures  -ethanol level in blood 246, utox negative on admission   -s/p  banana bag, will cont MVT, folic acid and thiamine  -CT head negative   -per conversation needs CT angio, given concern for basilar artery stenosis as this may be a TIA   -Pending MRI read  -pending EEG  -s/p Keppra load, monitor off antiepileptics for no  - on ativan taper

## 2019-12-12 NOTE — PROGRESS NOTE ADULT - PROBLEM SELECTOR PLAN 1
Sec to Alcoholic Intox vs new onset seizure  Alcoholic intoxication vs new onset seizures  -ethanol level in blood 246, utox negative on admission   -s/p  banana bag, will cont MVT, folic acid and thiamine  -CT head negative   -per conversation needs CT angio, given concern for basilar artery stenosis as this may be a TIA   -Pending MRI read  -pending EEG  -s/p Keppra load, monitor off antiepileptics for no  - on ativan taper Sec to Alcoholic Intox vs new onset seizure-  acute onset of unresponsiveness on adm s/p icu intubation and extubation.  Alcoholic intoxication vs new onset seizures- Now of anti- epileptics - no siezures.  -ethanol level in blood 246, utox negative on admission   -s/p  banana bag, will cont MVT, folic acid and thiamine  -CT head negative   -per conversation needs CT angio, given concern for basilar artery stenosis as this may be a TIA   -Brain  MRI 12/10- neg   -pending EEG  -s/p Keppra load, monitor off antiepileptics for no  - on ativan taper

## 2019-12-12 NOTE — CHART NOTE - NSCHARTNOTEFT_GEN_A_CORE
CTA H&N reviewed. No acute findings. Posterior circulation intact.  rEEG reviewed - no seizures but functional abnormality in  temporal regions R>L.  MRI Brain WAW - no acute findings    At this time would continue to monitor off AED.    - No further inpatient neurological workup warranted at this time  - ETOH cessation advised    Discharge planning    d/w neuro attending Dr. Moralse CTA H&N reviewed. No acute findings. Posterior circulation intact.    rEEG reviewed - no seizures but functional abnormality in  temporal regions R>L.    MRI Brain WAW - no acute findings    At this time would continue to monitor off AED.    - No further inpatient neurological workup warranted at this time    - ETOH cessation advised    Discharge planning    d/w neuro attending Dr. Morales

## 2019-12-12 NOTE — PROGRESS NOTE ADULT - ATTENDING COMMENTS
Ciwa Ciwa   R arm doppler- r/o dvt- IVL fluid- had R uuper Arm ' arrow "iv"  EEG  Vit b6 and f/u levels Ciwa   R arm doppler- r/o dvt- IVL fluid- had R uuper Arm ' arrow "iv"  EEG  Vit b6 and f/u levels  Start Iv B6  PCP Dr Diego [phone] Ciwa   R arm doppler- r/o dvt- IVL fluid- had R uuper Arm ' arrow "iv"  EEG  Vit b1 and f/u levels  Start Iv B1- thiamine x 3 days  PCP Dr Diego [phone] Ciwa   R arm doppler- r/o dvt- IVL fluid- had R uuper Arm ' arrow "iv"  EEG  Vit b1 and f/u levels  Start Iv B1- thiamine x 3 days  PCP Dr Diego [phone]  Sister Asha tel #  [phone] . Yony   R arm doppler- r/o dvt- IVL fluid- had R uuper Arm ' arrow "iv"  EEG  Vit b1 and f/u levels  Start Iv B1- thiamine x 3 days  PCP Dr Diego [phone]  Called Sister Asha tel #  [phone] .- and updated to hospital course.

## 2019-12-12 NOTE — PROGRESS NOTE ADULT - SUBJECTIVE AND OBJECTIVE BOX
Patient is a 59y old  Male who presents with a chief complaint of AMS (11 Dec 2019 10:10)      SUBJECTIVE / OVERNIGHT EVENTS:  patient on ciwa    MEDICATIONS  (STANDING):  atorvastatin 20 milliGRAM(s) Oral at bedtime  dextrose 5%. 1000 milliLiter(s) (50 mL/Hr) IV Continuous <Continuous>  dextrose 50% Injectable 12.5 Gram(s) IV Push once  dextrose 50% Injectable 25 Gram(s) IV Push once  dextrose 50% Injectable 25 Gram(s) IV Push once  folic acid 1 milliGRAM(s) Oral daily  heparin  Injectable 5000 Unit(s) SubCutaneous every 8 hours  influenza   Vaccine 0.5 milliLiter(s) IntraMuscular once  insulin lispro (HumaLOG) corrective regimen sliding scale   SubCutaneous every 6 hours  LORazepam   Injectable 2 milliGRAM(s) IV Push every 4 hours  LORazepam   Injectable   IV Push   multivitamin 1 Tablet(s) Oral daily  pantoprazole    Tablet 40 milliGRAM(s) Oral before breakfast  potassium chloride   Powder 40 milliEquivalent(s) Oral every 4 hours  potassium chloride  10 mEq/100 mL IVPB 10 milliEquivalent(s) IV Intermittent every 1 hour    MEDICATIONS  (PRN):  benzocaine 15 mG/menthol 3.6 mG (Sugar-Free) Lozenge 1 Lozenge Oral every 4 hours PRN Sore Throat  dextrose 40% Gel 15 Gram(s) Oral once PRN Blood Glucose LESS THAN 70 milliGRAM(s)/deciliter  glucagon  Injectable 1 milliGRAM(s) IntraMuscular once PRN Glucose LESS THAN 70 milligrams/deciliter        POCT Blood Glucose.: 209 mg/dL (12 Dec 2019 12:11)  POCT Blood Glucose.: 178 mg/dL (12 Dec 2019 08:31)  POCT Blood Glucose.: 165 mg/dL (11 Dec 2019 22:36)  POCT Blood Glucose.: 142 mg/dL (11 Dec 2019 17:20)    Vital Signs Last 24 Hrs  T(C): 36.8 (12 Dec 2019 12:13), Max: 37.2 (11 Dec 2019 21:00)  T(F): 98.3 (12 Dec 2019 12:13), Max: 98.9 (11 Dec 2019 21:00)  HR: 85 (12 Dec 2019 12:13) (78 - 92)  BP: 124/78 (12 Dec 2019 12:13) (124/78 - 143/80)  BP(mean): --  RR: 17 (12 Dec 2019 12:13) (16 - 18)  SpO2: 99% (12 Dec 2019 12:13) (97% - 100%)      PHYSICAL EXAM:  GENERAL: NAD, well-developed  HEAD:  Atraumatic, Normocephalic  EYES: EOMI, PERRLA, conjunctiva and sclera clear  NECK: Supple, No JVD  CHEST/LUNG: Clear to auscultation bilaterally; No wheeze  HEART: Regular rate and rhythm; No murmurs, rubs, or gallops  ABDOMEN: Soft, Nontender, Nondistended; Bowel sounds present  EXTREMITIES:  2+ Peripheral Pulses, No clubbing, cyanosis, or edema  PSYCH: Alert  NEUROLOGY: non-focal  SKIN: No rashes or lesions    LABS:                        11.2   6.67  )-----------( 214      ( 12 Dec 2019 06:14 )             35.0     12-12    140  |  105  |  7   ----------------------------<  161<H>  3.2<L>   |  21<L>  |  0.67    Ca    8.3<L>      12 Dec 2019 06:14  Phos  2.7     12-11  Mg     1.8     12-11    TPro  6.4  /  Alb  3.2<L>  /  TBili  0.5  /  DBili  x   /  AST  26  /  ALT  29  /  AlkPhos  65  12-11              RADIOLOGY & ADDITIONAL TESTS:    Imaging Personally Reviewed:    Consultant(s) Notes Reviewed:      Care Discussed with Consultants/Other Providers: Patient is a 59y old  Male who presents with a chief complaint of AMS (11 Dec 2019 10:10)      SUBJECTIVE / OVERNIGHT EVENTS:  patient on ciwa  c/o R arm swelling- new- will dec iVF  Patient is Ox2- cannot recall year , president of [place] is " [place]"    MEDICATIONS  (STANDING):  atorvastatin 20 milliGRAM(s) Oral at bedtime  dextrose 5%. 1000 milliLiter(s) (50 mL/Hr) IV Continuous <Continuous>  dextrose 50% Injectable 12.5 Gram(s) IV Push once  dextrose 50% Injectable 25 Gram(s) IV Push once  dextrose 50% Injectable 25 Gram(s) IV Push once  folic acid 1 milliGRAM(s) Oral daily  heparin  Injectable 5000 Unit(s) SubCutaneous every 8 hours  influenza   Vaccine 0.5 milliLiter(s) IntraMuscular once  insulin lispro (HumaLOG) corrective regimen sliding scale   SubCutaneous every 6 hours  LORazepam   Injectable 2 milliGRAM(s) IV Push every 4 hours  LORazepam   Injectable   IV Push   multivitamin 1 Tablet(s) Oral daily  pantoprazole    Tablet 40 milliGRAM(s) Oral before breakfast  potassium chloride   Powder 40 milliEquivalent(s) Oral every 4 hours  potassium chloride  10 mEq/100 mL IVPB 10 milliEquivalent(s) IV Intermittent every 1 hour    MEDICATIONS  (PRN):  benzocaine 15 mG/menthol 3.6 mG (Sugar-Free) Lozenge 1 Lozenge Oral every 4 hours PRN Sore Throat  dextrose 40% Gel 15 Gram(s) Oral once PRN Blood Glucose LESS THAN 70 milliGRAM(s)/deciliter  glucagon  Injectable 1 milliGRAM(s) IntraMuscular once PRN Glucose LESS THAN 70 milligrams/deciliter        POCT Blood Glucose.: 209 mg/dL (12 Dec 2019 12:11)  POCT Blood Glucose.: 178 mg/dL (12 Dec 2019 08:31)  POCT Blood Glucose.: 165 mg/dL (11 Dec 2019 22:36)  POCT Blood Glucose.: 142 mg/dL (11 Dec 2019 17:20)    Vital Signs Last 24 Hrs  T(C): 36.8 (12 Dec 2019 12:13), Max: 37.2 (11 Dec 2019 21:00)  T(F): 98.3 (12 Dec 2019 12:13), Max: 98.9 (11 Dec 2019 21:00)  HR: 85 (12 Dec 2019 12:13) (78 - 92)  BP: 124/78 (12 Dec 2019 12:13) (124/78 - 143/80)  BP(mean): --  RR: 17 (12 Dec 2019 12:13) (16 - 18)  SpO2: 99% (12 Dec 2019 12:13) (97% - 100%)      PHYSICAL EXAM:  GENERAL: NAD, well-developed  HEAD:  Atraumatic, Normocephalic  EYES: EOMI, PERRLA, conjunctiva and sclera clear  NECK: Supple, No JVD  CHEST/LUNG: Clear to auscultation bilaterally; No wheeze  HEART: Regular rate and rhythm; No murmurs, rubs, or gallops  ABDOMEN: Soft, Nontender, Nondistended; Bowel sounds present  EXTREMITIES:  2+ Peripheral Pulses, No clubbing, cyanosis, or edema  PSYCH: Alert, ox2. Patient is Ox2- cannot recall year , president of [place] is " [place]"    NEUROLOGY: non-focal  SKIN: No rashes or lesions    LABS:                        11.2   6.67  )-----------( 214      ( 12 Dec 2019 06:14 )             35.0     12-12    140  |  105  |  7   ----------------------------<  161<H>  3.2<L>   |  21<L>  |  0.67    Ca    8.3<L>      12 Dec 2019 06:14  Phos  2.7     12-11  Mg     1.8     12-11    TPro  6.4  /  Alb  3.2<L>  /  TBili  0.5  /  DBili  x   /  AST  26  /  ALT  29  /  AlkPhos  65  12-11  < from: MR Head w/wo IV Cont (12.10.19 @ 11:12) >  IMPRESSION:    Unremarkable MR of the brain.                    RADIOLOGY & ADDITIONAL TESTS:    Imaging Personally Reviewed:    Consultant(s) Notes Reviewed:      Care Discussed with Consultants/Other Providers:

## 2019-12-13 ENCOUNTER — TRANSCRIPTION ENCOUNTER (OUTPATIENT)
Age: 59
End: 2019-12-13

## 2019-12-13 LAB
ANION GAP SERPL CALC-SCNC: 12 MMO/L — SIGNIFICANT CHANGE UP (ref 7–14)
BUN SERPL-MCNC: 7 MG/DL — SIGNIFICANT CHANGE UP (ref 7–23)
CALCIUM SERPL-MCNC: 9.4 MG/DL — SIGNIFICANT CHANGE UP (ref 8.4–10.5)
CHLORIDE SERPL-SCNC: 107 MMOL/L — SIGNIFICANT CHANGE UP (ref 98–107)
CO2 SERPL-SCNC: 22 MMOL/L — SIGNIFICANT CHANGE UP (ref 22–31)
CREAT SERPL-MCNC: 0.75 MG/DL — SIGNIFICANT CHANGE UP (ref 0.5–1.3)
GLUCOSE BLDC GLUCOMTR-MCNC: 140 MG/DL — HIGH (ref 70–99)
GLUCOSE BLDC GLUCOMTR-MCNC: 149 MG/DL — HIGH (ref 70–99)
GLUCOSE BLDC GLUCOMTR-MCNC: 186 MG/DL — HIGH (ref 70–99)
GLUCOSE BLDC GLUCOMTR-MCNC: 201 MG/DL — HIGH (ref 70–99)
GLUCOSE BLDC GLUCOMTR-MCNC: 252 MG/DL — HIGH (ref 70–99)
GLUCOSE SERPL-MCNC: 171 MG/DL — HIGH (ref 70–99)
MAGNESIUM SERPL-MCNC: 1.7 MG/DL — SIGNIFICANT CHANGE UP (ref 1.6–2.6)
PHOSPHATE SERPL-MCNC: 3.2 MG/DL — SIGNIFICANT CHANGE UP (ref 2.5–4.5)
POTASSIUM SERPL-MCNC: 4.1 MMOL/L — SIGNIFICANT CHANGE UP (ref 3.5–5.3)
POTASSIUM SERPL-SCNC: 4.1 MMOL/L — SIGNIFICANT CHANGE UP (ref 3.5–5.3)
SODIUM SERPL-SCNC: 141 MMOL/L — SIGNIFICANT CHANGE UP (ref 135–145)

## 2019-12-13 PROCEDURE — 99232 SBSQ HOSP IP/OBS MODERATE 35: CPT

## 2019-12-13 RX ORDER — INSULIN LISPRO 100/ML
VIAL (ML) SUBCUTANEOUS AT BEDTIME
Refills: 0 | Status: DISCONTINUED | OUTPATIENT
Start: 2019-12-13 | End: 2019-12-16

## 2019-12-13 RX ORDER — SODIUM CHLORIDE 9 MG/ML
1000 INJECTION, SOLUTION INTRAVENOUS
Refills: 0 | Status: DISCONTINUED | OUTPATIENT
Start: 2019-12-13 | End: 2019-12-16

## 2019-12-13 RX ORDER — PANTOPRAZOLE SODIUM 20 MG/1
1 TABLET, DELAYED RELEASE ORAL
Qty: 30 | Refills: 0
Start: 2019-12-13 | End: 2020-01-11

## 2019-12-13 RX ORDER — THIAMINE MONONITRATE (VIT B1) 100 MG
1 TABLET ORAL
Qty: 30 | Refills: 0
Start: 2019-12-13 | End: 2020-01-11

## 2019-12-13 RX ORDER — INSULIN LISPRO 100/ML
VIAL (ML) SUBCUTANEOUS
Refills: 0 | Status: DISCONTINUED | OUTPATIENT
Start: 2019-12-13 | End: 2019-12-16

## 2019-12-13 RX ORDER — FOLIC ACID 0.8 MG
1 TABLET ORAL
Qty: 30 | Refills: 0
Start: 2019-12-13 | End: 2020-01-11

## 2019-12-13 RX ADMIN — ATORVASTATIN CALCIUM 20 MILLIGRAM(S): 80 TABLET, FILM COATED ORAL at 22:21

## 2019-12-13 RX ADMIN — Medication 2 MILLIGRAM(S): at 02:00

## 2019-12-13 RX ADMIN — Medication 105 MILLIGRAM(S): at 22:23

## 2019-12-13 RX ADMIN — PANTOPRAZOLE SODIUM 40 MILLIGRAM(S): 20 TABLET, DELAYED RELEASE ORAL at 06:21

## 2019-12-13 RX ADMIN — BENZOCAINE AND MENTHOL 1 LOZENGE: 5; 1 LIQUID ORAL at 10:44

## 2019-12-13 RX ADMIN — Medication 1 MILLIGRAM(S): at 14:12

## 2019-12-13 RX ADMIN — Medication 2 MILLIGRAM(S): at 06:21

## 2019-12-13 RX ADMIN — Medication 2: at 12:42

## 2019-12-13 RX ADMIN — Medication 105 MILLIGRAM(S): at 14:14

## 2019-12-13 RX ADMIN — INSULIN GLARGINE 10 UNIT(S): 100 INJECTION, SOLUTION SUBCUTANEOUS at 22:19

## 2019-12-13 RX ADMIN — HEPARIN SODIUM 5000 UNIT(S): 5000 INJECTION INTRAVENOUS; SUBCUTANEOUS at 14:12

## 2019-12-13 RX ADMIN — Medication 2 MILLIGRAM(S): at 10:40

## 2019-12-13 RX ADMIN — Medication 1 MILLIGRAM(S): at 22:22

## 2019-12-13 RX ADMIN — HEPARIN SODIUM 5000 UNIT(S): 5000 INJECTION INTRAVENOUS; SUBCUTANEOUS at 06:21

## 2019-12-13 RX ADMIN — HEPARIN SODIUM 5000 UNIT(S): 5000 INJECTION INTRAVENOUS; SUBCUTANEOUS at 22:22

## 2019-12-13 RX ADMIN — Medication 1 MILLIGRAM(S): at 12:41

## 2019-12-13 RX ADMIN — Medication 105 MILLIGRAM(S): at 06:21

## 2019-12-13 RX ADMIN — Medication 1: at 22:21

## 2019-12-13 RX ADMIN — Medication 1: at 07:32

## 2019-12-13 RX ADMIN — Medication 1 TABLET(S): at 12:42

## 2019-12-13 RX ADMIN — Medication 1 MILLIGRAM(S): at 18:39

## 2019-12-13 NOTE — PROGRESS NOTE ADULT - PROBLEM SELECTOR PLAN 3
fs with ss coverage  fs ovr 200.  hyperglycemia- hgb a1c 10.6 on admission.  start lantus 10 u qhs- patient is alert and eating.

## 2019-12-13 NOTE — DISCHARGE NOTE PROVIDER - PROVIDER TOKENS
PROVIDER:[TOKEN:[[id number]],FOLLOWUP:[2 weeks]] PROVIDER:[TOKEN:[[id number]],FOLLOWUP:[2 weeks]],PROVIDER:[TOKEN:[[id number]],SCHEDULEDAPPT:[12/18/2019],SCHEDULEDAPPTTIME:[11:30 AM]]

## 2019-12-13 NOTE — DISCHARGE NOTE PROVIDER - NSDCMRMEDTOKEN_GEN_ALL_CORE_FT
atorvastatin 20 mg oral tablet: 1 tab(s) orally once a day  enalapril 10 mg oral tablet: 1 tab(s) orally once a day  glimepiride 4 mg oral tablet: 1 tab(s) orally 2 times a day atorvastatin 20 mg oral tablet: 1 tab(s) orally once a day  enalapril 10 mg oral tablet: 1 tab(s) orally once a day  folic acid 1 mg oral tablet: 1 tab(s) orally once a day  glimepiride 4 mg oral tablet: 1 tab(s) orally 2 times a day  glucometer, Dx E 11.65: 1 each subcutaneous 3 times a day   lancets, FS 3 x day E 11.65: 1 each subcutaneous 3 times a day   Multiple Vitamins oral tablet: 1 tab(s) orally once a day  pantoprazole 40 mg oral delayed release tablet: 1 tab(s) orally once a day (before a meal)  strips, FS 3 x day, E 11.65: 3 each subcutaneous 3 times a day  #3 boxes   thiamine 100 mg oral tablet: 1 tab(s) orally once a day atorvastatin 20 mg oral tablet: 1 tab(s) orally once a day  enalapril 10 mg oral tablet: 1 tab(s) orally once a day  folic acid 1 mg oral tablet: 1 tab(s) orally once a day  glucometer, Dx E 11.65: 1 each subcutaneous 3 times a day   Janumet 50 mg-1000 mg oral tablet: 1 tab(s) orally 2 times a day  INSURANCE CHECK, PLEASE CALL [phone] for provider   lancets, FS 3 x day E 11.65: 1 each subcutaneous 3 times a day   Multiple Vitamins oral tablet: 1 tab(s) orally once a day  pantoprazole 40 mg oral delayed release tablet: 1 tab(s) orally once a day (before a meal)  strips, FS 3 x day, E 11.65: 3 each subcutaneous 3 times a day  #3 boxes   thiamine 100 mg oral tablet: 1 tab(s) orally once a day atorvastatin 20 mg oral tablet: 1 tab(s) orally once a day  folic acid 1 mg oral tablet: 1 tab(s) orally once a day  glucometer, Dx E 11.65: 1 each subcutaneous 3 times a day   Janumet 50 mg-1000 mg oral tablet: 1 tab(s) orally 2 times a day    lancets, FS 3 x day E 11.65: 1 each subcutaneous 3 times a day   Multiple Vitamins oral tablet: 1 tab(s) orally once a day  pantoprazole 40 mg oral delayed release tablet: 1 tab(s) orally once a day (before a meal)  strips, FS 3 x day, E 11.65: 3 each subcutaneous 3 times a day  #3 boxes   thiamine 100 mg oral tablet: 1 tab(s) orally once a day

## 2019-12-13 NOTE — DISCHARGE NOTE PROVIDER - CARE PROVIDER_API CALL
Opal Neal)  EndocrinologyMetabDiabetes; Internal Medicine  [id number] [place] [address]  Phone: [phone]  Fax: [phone]  Follow Up Time: 2 weeks Opal Neal)  EndocrinologyMetabDiabetes; Internal Medicine  [address] [place] [address]  Phone: [phone]  Fax: [phone]  Follow Up Time: 2 weeks Opal Neal)  EndocrinologyMetabDiabetes; Internal Medicine  [address] [place] [address]  Phone: [phone]  Fax: [phone]  Follow Up Time: 2 weeks    Mabel Diego)  Internal Medicine  [address] [place] [address]  Phone: [phone]  Fax: [phone]  Scheduled Appointment: 12/18/2019 11:30 AM

## 2019-12-13 NOTE — DISCHARGE NOTE PROVIDER - HOSPITAL COURSE
59Y M with PMH DM, HTN, HLD who presents with acute onset of unresponsiveness. CT Head negative for acute pathology, likely has Status epilepticus likely secondary to alcohol intoxication.  Mild nonspecific diffuse or multifocal cerebral dysfunction. Functional abnormality in the temporal regions, right > left. No epileptiform pattern or seizure seen.

## 2019-12-13 NOTE — PROGRESS NOTE ADULT - SUBJECTIVE AND OBJECTIVE BOX
Patient is a 59y old  Male who presents with a chief complaint of AMS (13 Dec 2019 09:03)      SUBJECTIVE / OVERNIGHT EVENTS:  Still getting Ativan iv nito    MEDICATIONS  (STANDING):  atorvastatin 20 milliGRAM(s) Oral at bedtime  dextrose 5%. 1000 milliLiter(s) (50 mL/Hr) IV Continuous <Continuous>  dextrose 50% Injectable 12.5 Gram(s) IV Push once  dextrose 50% Injectable 25 Gram(s) IV Push once  dextrose 50% Injectable 25 Gram(s) IV Push once  folic acid 1 milliGRAM(s) Oral daily  heparin  Injectable 5000 Unit(s) SubCutaneous every 8 hours  influenza   Vaccine 0.5 milliLiter(s) IntraMuscular once  insulin glargine Injectable (LANTUS) 10 Unit(s) SubCutaneous at bedtime  insulin lispro (HumaLOG) corrective regimen sliding scale   SubCutaneous every 6 hours  LORazepam   Injectable   IV Push   LORazepam   Injectable 1 milliGRAM(s) IV Push every 4 hours  multivitamin 1 Tablet(s) Oral daily  pantoprazole    Tablet 40 milliGRAM(s) Oral before breakfast  thiamine IVPB 500 milliGRAM(s) IV Intermittent every 8 hours    MEDICATIONS  (PRN):  benzocaine 15 mG/menthol 3.6 mG (Sugar-Free) Lozenge 1 Lozenge Oral every 4 hours PRN Sore Throat  dextrose 40% Gel 15 Gram(s) Oral once PRN Blood Glucose LESS THAN 70 milliGRAM(s)/deciliter  glucagon  Injectable 1 milliGRAM(s) IntraMuscular once PRN Glucose LESS THAN 70 milligrams/deciliter        CAPILLARY BLOOD GLUCOSE      POCT Blood Glucose.: 186 mg/dL (13 Dec 2019 06:42)  POCT Blood Glucose.: 149 mg/dL (13 Dec 2019 01:13)  POCT Blood Glucose.: 152 mg/dL (12 Dec 2019 23:36)  POCT Blood Glucose.: 136 mg/dL (12 Dec 2019 22:03)  POCT Blood Glucose.: 145 mg/dL (12 Dec 2019 17:20)  POCT Blood Glucose.: 209 mg/dL (12 Dec 2019 12:11)      Vital Signs Last 24 Hrs  T(C): 36.8 (13 Dec 2019 10:00), Max: 37 (13 Dec 2019 02:00)  T(F): 98.3 (13 Dec 2019 10:00), Max: 98.6 (13 Dec 2019 02:00)  HR: 86 (13 Dec 2019 10:00) (81 - 92)  BP: 143/79 (13 Dec 2019 10:00) (124/78 - 143/79)  BP(mean): --  RR: 16 (13 Dec 2019 10:00) (16 - 18)  SpO2: 98% (13 Dec 2019 10:00) (98% - 100%)  PHYSICAL EXAM:  GENERAL: NAD, well-developed  HEAD:  Atraumatic, Normocephalic  EYES: EOMI, PERRLA, conjunctiva and sclera clear  NECK: Supple, No JVD  CHEST/LUNG: Clear to auscultation bilaterally; No wheeze  HEART: Regular rate and rhythm; No murmurs, rubs, or gallops  ABDOMEN: Soft, Nontender, Nondistended; Bowel sounds present  EXTREMITIES:  2+ Peripheral Pulses, No clubbing, cyanosis, or edema  PSYCH: Alert  NEUROLOGY: non-focal  SKIN: No rashes or lesions    LABS:                        11.2   6.67  )-----------( 214      ( 12 Dec 2019 06:14 )             35.0     12-13    141  |  107  |  7   ----------------------------<  171<H>  4.1   |  22  |  0.75    Ca    9.4      13 Dec 2019 06:15  Phos  3.2     12-13  Mg     1.7     12-13        RADIOLOGY & ADDITIONAL TESTS:        Care Discussed with Consultants/Other Providers: Patient is a 59y old  Male who presents with a chief complaint of AMS (13 Dec 2019 09:03)      SUBJECTIVE / OVERNIGHT EVENTS:  Still getting Ativan iv nito.  endocrine to consult    MEDICATIONS  (STANDING):  atorvastatin 20 milliGRAM(s) Oral at bedtime  dextrose 5%. 1000 milliLiter(s) (50 mL/Hr) IV Continuous <Continuous>  dextrose 50% Injectable 12.5 Gram(s) IV Push once  dextrose 50% Injectable 25 Gram(s) IV Push once  dextrose 50% Injectable 25 Gram(s) IV Push once  folic acid 1 milliGRAM(s) Oral daily  heparin  Injectable 5000 Unit(s) SubCutaneous every 8 hours  influenza   Vaccine 0.5 milliLiter(s) IntraMuscular once  insulin glargine Injectable (LANTUS) 10 Unit(s) SubCutaneous at bedtime  insulin lispro (HumaLOG) corrective regimen sliding scale   SubCutaneous every 6 hours  LORazepam   Injectable   IV Push   LORazepam   Injectable 1 milliGRAM(s) IV Push every 4 hours  multivitamin 1 Tablet(s) Oral daily  pantoprazole    Tablet 40 milliGRAM(s) Oral before breakfast  thiamine IVPB 500 milliGRAM(s) IV Intermittent every 8 hours    MEDICATIONS  (PRN):  benzocaine 15 mG/menthol 3.6 mG (Sugar-Free) Lozenge 1 Lozenge Oral every 4 hours PRN Sore Throat  dextrose 40% Gel 15 Gram(s) Oral once PRN Blood Glucose LESS THAN 70 milliGRAM(s)/deciliter  glucagon  Injectable 1 milliGRAM(s) IntraMuscular once PRN Glucose LESS THAN 70 milligrams/deciliter        CAPILLARY BLOOD GLUCOSE      POCT Blood Glucose.: 186 mg/dL (13 Dec 2019 06:42)  POCT Blood Glucose.: 149 mg/dL (13 Dec 2019 01:13)  POCT Blood Glucose.: 152 mg/dL (12 Dec 2019 23:36)  POCT Blood Glucose.: 136 mg/dL (12 Dec 2019 22:03)  POCT Blood Glucose.: 145 mg/dL (12 Dec 2019 17:20)  POCT Blood Glucose.: 209 mg/dL (12 Dec 2019 12:11)      Vital Signs Last 24 Hrs  T(C): 36.8 (13 Dec 2019 10:00), Max: 37 (13 Dec 2019 02:00)  T(F): 98.3 (13 Dec 2019 10:00), Max: 98.6 (13 Dec 2019 02:00)  HR: 86 (13 Dec 2019 10:00) (81 - 92)  BP: 143/79 (13 Dec 2019 10:00) (124/78 - 143/79)  BP(mean): --  RR: 16 (13 Dec 2019 10:00) (16 - 18)  SpO2: 98% (13 Dec 2019 10:00) (98% - 100%)  PHYSICAL EXAM:  GENERAL: NAD, well-developed  HEAD:  Atraumatic, Normocephalic  EYES: EOMI, PERRLA, conjunctiva and sclera clear  NECK: Supple, No JVD  CHEST/LUNG: Clear to auscultation bilaterally; No wheeze  HEART: Regular rate and rhythm; No murmurs, rubs, or gallops  ABDOMEN: Soft, Nontender, Nondistended; Bowel sounds present  EXTREMITIES:  2+ Peripheral Pulses, No clubbing, cyanosis, or edema  PSYCH: Alert  NEUROLOGY: non-focal  SKIN: No rashes or lesions    LABS:                        11.2   6.67  )-----------( 214      ( 12 Dec 2019 06:14 )             35.0     12-13    141  |  107  |  7   ----------------------------<  171<H>  4.1   |  22  |  0.75    Ca    9.4      13 Dec 2019 06:15  Phos  3.2     12-13  Mg     1.7     12-13        RADIOLOGY & ADDITIONAL TESTS:        Care Discussed with Consultants/Other Providers:

## 2019-12-13 NOTE — PROGRESS NOTE ADULT - PROBLEM SELECTOR PLAN 1
Sec to Alcoholic Intox vs new onset seizure-  acute onset of unresponsiveness on adm s/p icu intubation and extubation.  Alcoholic intoxication vs new onset seizures- Now of anti- epileptics - no siezures.  -ethanol level in blood 246, utox negative on admission   -s/p  banana bag, will cont MVT, folic acid and thiamine  -CT head negative   -per conversation needs CT angio, given concern for basilar artery stenosis as this may be a TIA   -Brain  MRI 12/10- neg   -pending EEG  -s/p Keppra load, monitor off antiepileptics for no  - on ativan taper

## 2019-12-13 NOTE — PROGRESS NOTE ADULT - PROBLEM SELECTOR PLAN 4
head  MRI 12./10- neg  -pending EEG  -s/p Keppra load, monitor off antiepileptics for no  - on ativan taper

## 2019-12-13 NOTE — DISCHARGE NOTE PROVIDER - NSDCCAREPROVSEEN_GEN_ALL_CORE_FT
[facility] Medicine,  10  [facility] ACP, Team [facility], HS 10  [facility] ACP, Team  Evelyn Sunshine

## 2019-12-13 NOTE — PROGRESS NOTE ADULT - ASSESSMENT
59Y M with PMH DM, HTN, HLD who presents with acute onset of unresponsiveness. CT Head negative for acute pathology, likely has Status epilepticus likely secondary to alcohol intoxication.  ICU STAY with Intubation and extubation and transferred to floor 12/11/19  [place]  cognitive impairment - Ox2- ? sec to Wernicke's encephalopathy ??- f/u vit B1 level- start vit B1 iv x 3days  S/s 12/11- soft solid- thin liquids  Neuro- to f/u  R upper Ext dopler- R/o DVT- doppler ord- IVL  Hypokalemia- supplemented- resolved

## 2019-12-13 NOTE — PHYSICAL THERAPY INITIAL EVALUATION ADULT - PERTINENT HX OF CURRENT PROBLEM, REHAB EVAL
Pt. is a 59 year old male admitted to [facility] due to AMS. PMH: Diabetes mellitus, Osteoarthritis.

## 2019-12-13 NOTE — DISCHARGE NOTE PROVIDER - NSDCCPCAREPLAN_GEN_ALL_CORE_FT
PRINCIPAL DISCHARGE DIAGNOSIS  Diagnosis: AMS (altered mental status)  Assessment and Plan of Treatment: resolved. Obstain from alcohol      SECONDARY DISCHARGE DIAGNOSES  Diagnosis: Hyperlipidemia  Assessment and Plan of Treatment: Continue Simvastatin and low fat diet    Diagnosis: Seizure  Assessment and Plan of Treatment: CT Head negative for acute pathology. Mild nonspecific diffuse or multifocal cerebral dysfunction. Functional abnormality in the temporal regions, right > left. No epileptiform pattern or seizure seen.      Diagnosis: Diabetes mellitus  Assessment and Plan of Treatment: Follow consistent carbohydrate diet, take Janumet 50/1000mg 2 x day   check FS 2-3 x day. Stop Glimeperide. Call to make a follow up appt with your PCP and or Dr Neal (diabetic doctor) for further management    Diagnosis: Alcohol intoxication with delirium  Assessment and Plan of Treatment: Obstin from alcohol, take Thiamin, Multivitamin and Folic acid    Diagnosis: Essential hypertension  Assessment and Plan of Treatment: Low salt diet. Lisinopril held due to low normal blood pressure . Follow up with Dr Moncada in 1 week for further evaluation and adjustment in therapy

## 2019-12-13 NOTE — PROGRESS NOTE ADULT - PROBLEM SELECTOR PLAN 5
takes enalapril 10mg at home per outpt pharmacy  -holding off antihypertensives  - monitor BPs and restart BP med as tolerated  -obtain official med rec, as rec obtained while pt intubated

## 2019-12-14 LAB
ANION GAP SERPL CALC-SCNC: 16 MMO/L — HIGH (ref 7–14)
BUN SERPL-MCNC: 12 MG/DL — SIGNIFICANT CHANGE UP (ref 7–23)
CALCIUM SERPL-MCNC: 9 MG/DL — SIGNIFICANT CHANGE UP (ref 8.4–10.5)
CHLORIDE SERPL-SCNC: 102 MMOL/L — SIGNIFICANT CHANGE UP (ref 98–107)
CO2 SERPL-SCNC: 21 MMOL/L — LOW (ref 22–31)
CREAT SERPL-MCNC: 0.82 MG/DL — SIGNIFICANT CHANGE UP (ref 0.5–1.3)
GLUCOSE BLDC GLUCOMTR-MCNC: 129 MG/DL — HIGH (ref 70–99)
GLUCOSE BLDC GLUCOMTR-MCNC: 153 MG/DL — HIGH (ref 70–99)
GLUCOSE BLDC GLUCOMTR-MCNC: 166 MG/DL — HIGH (ref 70–99)
GLUCOSE BLDC GLUCOMTR-MCNC: 221 MG/DL — HIGH (ref 70–99)
GLUCOSE SERPL-MCNC: 225 MG/DL — HIGH (ref 70–99)
MAGNESIUM SERPL-MCNC: 1.7 MG/DL — SIGNIFICANT CHANGE UP (ref 1.6–2.6)
PHOSPHATE SERPL-MCNC: 3.8 MG/DL — SIGNIFICANT CHANGE UP (ref 2.5–4.5)
POTASSIUM SERPL-MCNC: 4 MMOL/L — SIGNIFICANT CHANGE UP (ref 3.5–5.3)
POTASSIUM SERPL-SCNC: 4 MMOL/L — SIGNIFICANT CHANGE UP (ref 3.5–5.3)
SODIUM SERPL-SCNC: 139 MMOL/L — SIGNIFICANT CHANGE UP (ref 135–145)

## 2019-12-14 PROCEDURE — 99232 SBSQ HOSP IP/OBS MODERATE 35: CPT

## 2019-12-14 PROCEDURE — 93971 EXTREMITY STUDY: CPT | Mod: 26

## 2019-12-14 RX ORDER — INSULIN LISPRO 100/ML
4 VIAL (ML) SUBCUTANEOUS
Refills: 0 | Status: DISCONTINUED | OUTPATIENT
Start: 2019-12-14 | End: 2019-12-16

## 2019-12-14 RX ADMIN — ATORVASTATIN CALCIUM 20 MILLIGRAM(S): 80 TABLET, FILM COATED ORAL at 22:29

## 2019-12-14 RX ADMIN — INSULIN GLARGINE 10 UNIT(S): 100 INJECTION, SOLUTION SUBCUTANEOUS at 22:28

## 2019-12-14 RX ADMIN — Medication 1 TABLET(S): at 13:44

## 2019-12-14 RX ADMIN — Medication 105 MILLIGRAM(S): at 22:28

## 2019-12-14 RX ADMIN — HEPARIN SODIUM 5000 UNIT(S): 5000 INJECTION INTRAVENOUS; SUBCUTANEOUS at 06:24

## 2019-12-14 RX ADMIN — Medication 4 UNIT(S): at 13:43

## 2019-12-14 RX ADMIN — Medication 105 MILLIGRAM(S): at 06:47

## 2019-12-14 RX ADMIN — Medication 2: at 08:46

## 2019-12-14 RX ADMIN — Medication 4 UNIT(S): at 17:54

## 2019-12-14 RX ADMIN — HEPARIN SODIUM 5000 UNIT(S): 5000 INJECTION INTRAVENOUS; SUBCUTANEOUS at 13:44

## 2019-12-14 RX ADMIN — Medication 1 MILLIGRAM(S): at 10:09

## 2019-12-14 RX ADMIN — Medication 1: at 17:54

## 2019-12-14 RX ADMIN — HEPARIN SODIUM 5000 UNIT(S): 5000 INJECTION INTRAVENOUS; SUBCUTANEOUS at 22:28

## 2019-12-14 RX ADMIN — Medication 1 MILLIGRAM(S): at 02:14

## 2019-12-14 RX ADMIN — Medication 1 MILLIGRAM(S): at 13:44

## 2019-12-14 RX ADMIN — Medication 105 MILLIGRAM(S): at 13:43

## 2019-12-14 RX ADMIN — PANTOPRAZOLE SODIUM 40 MILLIGRAM(S): 20 TABLET, DELAYED RELEASE ORAL at 06:24

## 2019-12-14 RX ADMIN — Medication 1 MILLIGRAM(S): at 06:24

## 2019-12-14 NOTE — CONSULT NOTE ADULT - ASSESSMENT
Assessment  DMT2: 59y Male with DM T2 with hyperglycemia, was on oral meds at home, blood sugars fluctuating due to inconsistent food intake, gets  hypoglycemic episode,  eating meals,  non compliant with low carb diet.  AMS: on medications, no chest pain, stable, monitored.  ETOH dependence: On treatment, awake, stable.  HTN: Controlled,  on antihypertensive medications.        Opal Neal MD  Cell: [phone]  Office: [phone]

## 2019-12-14 NOTE — CONSULT NOTE ADULT - PROBLEM SELECTOR RECOMMENDATION 9
Will start on low dose pre-meal bolus, Will continue current insulin coverage n for now. Will continue monitoring FS, log, will Follow up.  May get DC home on Janumet 50/1000mg PO BID. Stop Glimepiride, can cause hypoglycemia with inconsistent food intake situations.  Patient counseled for compliance with consistent low carb diet, exercise as tolerated as out patient.

## 2019-12-14 NOTE — CONSULT NOTE ADULT - SUBJECTIVE AND OBJECTIVE BOX
HPI:  CHIEF COMPLAINT: AMS    HPI  Patient is a 59Y M with PMH DM, HTN, HLD who presents with acute onset of altered mental status. Per niece at bedside, she was upstairs and heard a thump (unclear if patient fell or if he dropped something). Niece heard her grandmother, who was in the kitchen at the time, call out for her so she went to evaluate her uncle who was in the middle of eating dinner. Patient told his niece that he was not hungry anymore and he did not feel so good. He stated he was seeing something at the top of his eyes and then slumped over at the table. Was not arousable per family, though niece states he was squeezing her hand en route in EMS. Code stroke called on arrival.    CT Head negative for hemorrhage or acute infarct. While pending CTA, patient vomited and was subsequently intubated for airway protection. He was then noted to be moving all extremities and had a left upwards gaze deviation. Shortly after, was noticed to have adducted, contracted and rigid RUE as well as episode of urinary incontinence. No prior hx of seizures. In the ED, patient's tylenol and salicyclate level was wnl, Ethanol level was elevated at 246. electrolytes are wnl. Started on propofol for sedation.  Patient has history of diabetes, on oral meds at home, gets hypoglycemic episodes, no polyuria polydipsia. Patient follows up with PCP for diabetes management.      Niece endorses hx of focal seizures but is unsure of rest of family hx. Patients mother unable to be reached at time of exam.        FAMILY HISTORY:    Allergies    No Known Allergies    Intolerances    HOME MEDICATIONS:    HOSPITAL MEDICATIONS:  MEDICATIONS  (STANDING):  chlorhexidine 0.12% Liquid 15 milliLiter(s) Oral Mucosa every 12 hours  propofol Infusion 10 MICROgram(s)/kG/Min (4.968 mL/Hr) IV Continuous <Continuous>    MEDICATIONS  (PRN): (08 Dec 2019 19:22)  PAST MEDICAL & SURGICAL HISTORY:  Osteoarthritis  Diabetes mellitus      FAMILY HISTORY:      Social History:    Outpatient Medications:    MEDICATIONS  (STANDING):  atorvastatin 20 milliGRAM(s) Oral at bedtime  dextrose 5%. 1000 milliLiter(s) (50 mL/Hr) IV Continuous <Continuous>  dextrose 5%. 1000 milliLiter(s) (50 mL/Hr) IV Continuous <Continuous>  dextrose 50% Injectable 12.5 Gram(s) IV Push once  dextrose 50% Injectable 25 Gram(s) IV Push once  dextrose 50% Injectable 25 Gram(s) IV Push once  folic acid 1 milliGRAM(s) Oral daily  heparin  Injectable 5000 Unit(s) SubCutaneous every 8 hours  influenza   Vaccine 0.5 milliLiter(s) IntraMuscular once  insulin glargine Injectable (LANTUS) 10 Unit(s) SubCutaneous at bedtime  insulin lispro (HumaLOG) corrective regimen sliding scale   SubCutaneous three times a day before meals  insulin lispro (HumaLOG) corrective regimen sliding scale   SubCutaneous at bedtime  insulin lispro Injectable (HumaLOG) 4 Unit(s) SubCutaneous three times a day before meals  LORazepam   Injectable   IV Push   LORazepam   Injectable 1 milliGRAM(s) IV Push every 12 hours  multivitamin 1 Tablet(s) Oral daily  pantoprazole    Tablet 40 milliGRAM(s) Oral before breakfast  thiamine IVPB 500 milliGRAM(s) IV Intermittent every 8 hours    MEDICATIONS  (PRN):  benzocaine 15 mG/menthol 3.6 mG (Sugar-Free) Lozenge 1 Lozenge Oral every 4 hours PRN Sore Throat  dextrose 40% Gel 15 Gram(s) Oral once PRN Blood Glucose LESS THAN 70 milliGRAM(s)/deciliter  glucagon  Injectable 1 milliGRAM(s) IntraMuscular once PRN Glucose LESS THAN 70 milligrams/deciliter      Allergies    No Known Allergies    Intolerances      Review of Systems:  Constitutional: No fever, no chills  Eyes: No blurry vision  Neuro: No tremors  HEENT: No pain, no neck swelling  Cardiovascular: No chest pain, no palpitations  Respiratory: No SOB, no cough  GI: No nausea, vomiting, abdominal pain  : No dysuria  Skin: no rash  MSK: Has leg swelling.  Psych: no depression  Endocrine: no polyuria, polydipsia    UNABLE TO OBTAIN    ALL OTHER SYSTEMS REVIEWED AND NEGATIVE        PHYSICAL EXAM:  VITALS: T(C): 36.7 (12-14-19 @ 10:00)  T(F): 98.1 (12-14-19 @ 10:00), Max: 98.7 (12-13-19 @ 18:00)  HR: 88 (12-14-19 @ 10:00) (88 - 96)  BP: 126/74 (12-14-19 @ 10:00) (124/77 - 138/82)  RR:  (16 - 17)  SpO2:  (98% - 100%)  Wt(kg): --  GENERAL: NAD, well-groomed, well-developed  EYES: No proptosis, no lid lag  HEENT:  Atraumatic, Normocephalic  THYROID: Normal size, no palpable nodules  RESPIRATORY: Clear to auscultation bilaterally; No rales, rhonchi, wheezing  CARDIOVASCULAR: Si S2, No murmurs;  GI: Soft, non distended, normal bowel sounds  SKIN: Dry, intact, No rashes or lesions  MUSCULOSKELETAL: Has BL lower extremity edema.  NEURO:  no tremor, sensation decreased in feet BL,    POCT Blood Glucose.: 129 mg/dL (12-14-19 @ 13:36)  POCT Blood Glucose.: 221 mg/dL (12-14-19 @ 08:14)  POCT Blood Glucose.: 252 mg/dL (12-13-19 @ 22:02)  POCT Blood Glucose.: 140 mg/dL (12-13-19 @ 17:17)  POCT Blood Glucose.: 201 mg/dL (12-13-19 @ 12:26)  POCT Blood Glucose.: 186 mg/dL (12-13-19 @ 06:42)  POCT Blood Glucose.: 149 mg/dL (12-13-19 @ 01:13)  POCT Blood Glucose.: 152 mg/dL (12-12-19 @ 23:36)  POCT Blood Glucose.: 136 mg/dL (12-12-19 @ 22:03)  POCT Blood Glucose.: 145 mg/dL (12-12-19 @ 17:20)  POCT Blood Glucose.: 209 mg/dL (12-12-19 @ 12:11)  POCT Blood Glucose.: 178 mg/dL (12-12-19 @ 08:31)  POCT Blood Glucose.: 165 mg/dL (12-11-19 @ 22:36)  POCT Blood Glucose.: 142 mg/dL (12-11-19 @ 17:20)                            11.2   6.67  )-----------( 214      ( 12 Dec 2019 06:14 )             35.0       12-14    139  |  102  |  12  ----------------------------<  225<H>  4.0   |  21<L>  |  0.82    EGFR if : 114  EGFR if non : 98    Ca    9.0      12-14  Mg     1.7     12-14  Phos  3.8     12-14        Thyroid Function Tests:  12-11 @ 06:40 TSH 1.42 FreeT4 -- T3 -- Anti TPO -- Anti Thyroglobulin Ab -- TSI --      Hemoglobin A1C, Whole Blood: 10.6 % <H> [4.0 - 5.6] (12-11-19 @ 06:40)  Hemoglobin A1C, Whole Blood: 10.6 % <H> [4.0 - 5.6] (12-09-19 @ 03:00)          Radiology:

## 2019-12-14 NOTE — PROGRESS NOTE ADULT - PROBLEM SELECTOR PLAN 2
Alcoholic intoxication vs new onset seizures  -ethanol level in blood 246, utox negative on admission   -s/p  banana bag, will cont MVT, folic acid and thiamine  -CT head negative   -per conversation needs CT angio, given concern for basilar artery stenosis as this may be a TIA   -Pending MRI read  -pending EEG  -s/p Keppra load, monitor off antiepileptics for now  - on ativan taper

## 2019-12-14 NOTE — PROGRESS NOTE ADULT - SUBJECTIVE AND OBJECTIVE BOX
[facility] Division of Hospital Medicine  Vicky Mc DO  Pager: [phone]  Other Times:  [phone]    Patient is a 59y old  Male who presents with a chief complaint of AMS (13 Dec 2019 11:52)    SUBJECTIVE / OVERNIGHT EVENTS:  Patient seen denying any complaints.     ADDITIONAL REVIEW OF SYSTEMS:    MEDICATIONS  (STANDING):  atorvastatin 20 milliGRAM(s) Oral at bedtime  dextrose 5%. 1000 milliLiter(s) (50 mL/Hr) IV Continuous <Continuous>  dextrose 5%. 1000 milliLiter(s) (50 mL/Hr) IV Continuous <Continuous>  dextrose 50% Injectable 12.5 Gram(s) IV Push once  dextrose 50% Injectable 25 Gram(s) IV Push once  dextrose 50% Injectable 25 Gram(s) IV Push once  folic acid 1 milliGRAM(s) Oral daily  heparin  Injectable 5000 Unit(s) SubCutaneous every 8 hours  influenza   Vaccine 0.5 milliLiter(s) IntraMuscular once  insulin glargine Injectable (LANTUS) 10 Unit(s) SubCutaneous at bedtime  insulin lispro (HumaLOG) corrective regimen sliding scale   SubCutaneous three times a day before meals  insulin lispro (HumaLOG) corrective regimen sliding scale   SubCutaneous at bedtime  LORazepam   Injectable   IV Push   LORazepam   Injectable 1 milliGRAM(s) IV Push every 12 hours  multivitamin 1 Tablet(s) Oral daily  pantoprazole    Tablet 40 milliGRAM(s) Oral before breakfast  thiamine IVPB 500 milliGRAM(s) IV Intermittent every 8 hours    MEDICATIONS  (PRN):  benzocaine 15 mG/menthol 3.6 mG (Sugar-Free) Lozenge 1 Lozenge Oral every 4 hours PRN Sore Throat  dextrose 40% Gel 15 Gram(s) Oral once PRN Blood Glucose LESS THAN 70 milliGRAM(s)/deciliter  glucagon  Injectable 1 milliGRAM(s) IntraMuscular once PRN Glucose LESS THAN 70 milligrams/deciliter      CAPILLARY BLOOD GLUCOSE      POCT Blood Glucose.: 221 mg/dL (14 Dec 2019 08:14)  POCT Blood Glucose.: 252 mg/dL (13 Dec 2019 22:02)  POCT Blood Glucose.: 140 mg/dL (13 Dec 2019 17:17)  POCT Blood Glucose.: 201 mg/dL (13 Dec 2019 12:26)    I&O's Summary      PHYSICAL EXAM:  Vital Signs Last 24 Hrs  T(C): 36.7 (14 Dec 2019 10:00), Max: 37.1 (13 Dec 2019 18:00)  T(F): 98.1 (14 Dec 2019 10:00), Max: 98.7 (13 Dec 2019 18:00)  HR: 88 (14 Dec 2019 10:00) (84 - 96)  BP: 126/74 (14 Dec 2019 10:00) (124/77 - 144/81)  BP(mean): --  RR: 16 (14 Dec 2019 10:00) (16 - 17)  SpO2: 100% (14 Dec 2019 10:00) (98% - 100%)  CONSTITUTIONAL: NAD, well-developed, well-groomed  EYES: PERRLA; conjunctiva and sclera clear  ENMT: Moist oral mucosa, no pharyngeal injection or exudates; normal dentition  NECK: Supple, no palpable masses; no thyromegaly  RESPIRATORY: Normal respiratory effort; lungs are clear to auscultation bilaterally  CARDIOVASCULAR: Regular rate and rhythm, normal S1 and S2, no murmur/rub/gallop; No lower extremity edema; Peripheral pulses are 2+ bilaterally  ABDOMEN: Nontender to palpation, normoactive bowel sounds, no rebound/guarding; No hepatosplenomegaly  MUSCLOSKELETAL:  Normal gait; no clubbing or cyanosis of digits; no joint swelling or tenderness to palpation  PSYCH: A+O to person, place, and time; affect appropriate  NEUROLOGY: CN 2-12 are intact and symmetric; no gross sensory deficits;   SKIN: No rashes; no palpable lesions    LABS:    12-14    139  |  102  |  12  ----------------------------<  225<H>  4.0   |  21<L>  |  0.82    Ca    9.0      14 Dec 2019 06:35  Phos  3.8     12-14  Mg     1.7     12-14                  RADIOLOGY & ADDITIONAL TESTS:  Results Reviewed:   Imaging Personally Reviewed:  Electrocardiogram Personally Reviewed:    COORDINATION OF CARE:  Care Discussed with Consultants/Other Providers [Y/N]:  Prior or Outpatient Records Reviewed [Y/N]:

## 2019-12-14 NOTE — PROGRESS NOTE ADULT - PROBLEM SELECTOR PLAN 4
head  MRI 12./10- neg, EEG neg for seizure  -s/p Keppra load, monitor off antiepileptics for no  - on ativan taper

## 2019-12-14 NOTE — PROGRESS NOTE ADULT - PROBLEM SELECTOR PLAN 1
Sec to Alcoholic Intox vs new onset seizure-  acute onset of unresponsiveness on adm s/p icu intubation and extubation.  Alcoholic intoxication vs new onset seizures- Now of anti- epileptics - no seizures.  -ethanol level in blood 246, utox negative on admission   -s/p  banana bag, will cont MVT, folic acid and thiamine  -CT head negative   -per conversation needs CT angio, given concern for basilar artery stenosis as this may be a TIA   -Brain  MRI 12/10- neg, EEG neg  -s/p Keppra load, monitor off antiepileptics   - on ativan taper

## 2019-12-14 NOTE — PROGRESS NOTE ADULT - PROBLEM SELECTOR PLAN 3
fs with ss coverage  fs ovr 200.  hyperglycemia- hgb a1c 10.6 on admission.  - lantus 10 u qhs- patient is alert and eating.

## 2019-12-15 LAB
BACTERIA BLD CULT: SIGNIFICANT CHANGE UP
BACTERIA BLD CULT: SIGNIFICANT CHANGE UP
GLUCOSE BLDC GLUCOMTR-MCNC: 120 MG/DL — HIGH (ref 70–99)
GLUCOSE BLDC GLUCOMTR-MCNC: 151 MG/DL — HIGH (ref 70–99)
GLUCOSE BLDC GLUCOMTR-MCNC: 174 MG/DL — HIGH (ref 70–99)
GLUCOSE BLDC GLUCOMTR-MCNC: 201 MG/DL — HIGH (ref 70–99)

## 2019-12-15 PROCEDURE — 99232 SBSQ HOSP IP/OBS MODERATE 35: CPT

## 2019-12-15 RX ORDER — GLIMEPIRIDE 1 MG
1 TABLET ORAL
Qty: 0 | Refills: 0 | DISCHARGE

## 2019-12-15 RX ORDER — SITAGLIPTIN AND METFORMIN HYDROCHLORIDE 500; 50 MG/1; MG/1
1 TABLET, FILM COATED ORAL
Qty: 60 | Refills: 0
Start: 2019-12-15 | End: 2020-01-13

## 2019-12-15 RX ORDER — THIAMINE MONONITRATE (VIT B1) 100 MG
100 TABLET ORAL DAILY
Refills: 0 | Status: DISCONTINUED | OUTPATIENT
Start: 2019-12-16 | End: 2019-12-16

## 2019-12-15 RX ADMIN — ATORVASTATIN CALCIUM 20 MILLIGRAM(S): 80 TABLET, FILM COATED ORAL at 21:50

## 2019-12-15 RX ADMIN — Medication 1 MILLIGRAM(S): at 17:48

## 2019-12-15 RX ADMIN — Medication 1: at 12:41

## 2019-12-15 RX ADMIN — HEPARIN SODIUM 5000 UNIT(S): 5000 INJECTION INTRAVENOUS; SUBCUTANEOUS at 12:42

## 2019-12-15 RX ADMIN — HEPARIN SODIUM 5000 UNIT(S): 5000 INJECTION INTRAVENOUS; SUBCUTANEOUS at 06:04

## 2019-12-15 RX ADMIN — HEPARIN SODIUM 5000 UNIT(S): 5000 INJECTION INTRAVENOUS; SUBCUTANEOUS at 21:50

## 2019-12-15 RX ADMIN — INSULIN GLARGINE 10 UNIT(S): 100 INJECTION, SOLUTION SUBCUTANEOUS at 22:28

## 2019-12-15 RX ADMIN — BENZOCAINE AND MENTHOL 1 LOZENGE: 5; 1 LIQUID ORAL at 21:50

## 2019-12-15 RX ADMIN — Medication 2: at 08:20

## 2019-12-15 RX ADMIN — Medication 105 MILLIGRAM(S): at 06:03

## 2019-12-15 RX ADMIN — Medication 1 MILLIGRAM(S): at 12:41

## 2019-12-15 RX ADMIN — Medication 105 MILLIGRAM(S): at 12:42

## 2019-12-15 RX ADMIN — Medication 1 TABLET(S): at 12:41

## 2019-12-15 RX ADMIN — Medication 4 UNIT(S): at 08:20

## 2019-12-15 RX ADMIN — Medication 4 UNIT(S): at 12:41

## 2019-12-15 RX ADMIN — Medication 1 MILLIGRAM(S): at 06:03

## 2019-12-15 RX ADMIN — Medication 4 UNIT(S): at 17:48

## 2019-12-15 RX ADMIN — PANTOPRAZOLE SODIUM 40 MILLIGRAM(S): 20 TABLET, DELAYED RELEASE ORAL at 06:04

## 2019-12-15 NOTE — PROGRESS NOTE ADULT - SUBJECTIVE AND OBJECTIVE BOX
[facility] Division of Hospital Medicine  Vicky Mc DO  Pager: [phone]  Other Times:  [phone]    Patient is a 59y old  Male who presents with a chief complaint of AMS (14 Dec 2019 14:17)    SUBJECTIVE / OVERNIGHT EVENTS:  Patient seen denying any complaints.    ADDITIONAL REVIEW OF SYSTEMS:    MEDICATIONS  (STANDING):  atorvastatin 20 milliGRAM(s) Oral at bedtime  dextrose 5%. 1000 milliLiter(s) (50 mL/Hr) IV Continuous <Continuous>  dextrose 5%. 1000 milliLiter(s) (50 mL/Hr) IV Continuous <Continuous>  dextrose 50% Injectable 12.5 Gram(s) IV Push once  dextrose 50% Injectable 25 Gram(s) IV Push once  dextrose 50% Injectable 25 Gram(s) IV Push once  folic acid 1 milliGRAM(s) Oral daily  heparin  Injectable 5000 Unit(s) SubCutaneous every 8 hours  influenza   Vaccine 0.5 milliLiter(s) IntraMuscular once  insulin glargine Injectable (LANTUS) 10 Unit(s) SubCutaneous at bedtime  insulin lispro (HumaLOG) corrective regimen sliding scale   SubCutaneous three times a day before meals  insulin lispro (HumaLOG) corrective regimen sliding scale   SubCutaneous at bedtime  insulin lispro Injectable (HumaLOG) 4 Unit(s) SubCutaneous three times a day before meals  LORazepam   Injectable   IV Push   LORazepam   Injectable 1 milliGRAM(s) IV Push every 12 hours  multivitamin 1 Tablet(s) Oral daily  pantoprazole    Tablet 40 milliGRAM(s) Oral before breakfast  thiamine IVPB 500 milliGRAM(s) IV Intermittent every 8 hours    MEDICATIONS  (PRN):  benzocaine 15 mG/menthol 3.6 mG (Sugar-Free) Lozenge 1 Lozenge Oral every 4 hours PRN Sore Throat  dextrose 40% Gel 15 Gram(s) Oral once PRN Blood Glucose LESS THAN 70 milliGRAM(s)/deciliter  glucagon  Injectable 1 milliGRAM(s) IntraMuscular once PRN Glucose LESS THAN 70 milligrams/deciliter      CAPILLARY BLOOD GLUCOSE      POCT Blood Glucose.: 201 mg/dL (15 Dec 2019 08:18)  POCT Blood Glucose.: 166 mg/dL (14 Dec 2019 22:20)  POCT Blood Glucose.: 153 mg/dL (14 Dec 2019 17:12)  POCT Blood Glucose.: 129 mg/dL (14 Dec 2019 13:36)    I&O's Summary      PHYSICAL EXAM:  Vital Signs Last 24 Hrs  T(C): 36.5 (15 Dec 2019 06:00), Max: 37.4 (14 Dec 2019 18:00)  T(F): 97.7 (15 Dec 2019 06:00), Max: 99.4 (14 Dec 2019 18:00)  HR: 70 (15 Dec 2019 06:00) (70 - 93)  BP: 121/71 (15 Dec 2019 06:00) (120/62 - 136/68)  BP(mean): --  RR: 18 (15 Dec 2019 06:00) (16 - 18)  SpO2: 98% (15 Dec 2019 06:00) (96% - 100%)  CONSTITUTIONAL: NAD, well-developed, well-groomed  EYES: PERRLA; conjunctiva and sclera clear  ENMT: Moist oral mucosa, no pharyngeal injection or exudates; normal dentition  NECK: Supple, no palpable masses; no thyromegaly  RESPIRATORY: Normal respiratory effort; lungs are clear to auscultation bilaterally  CARDIOVASCULAR: Regular rate and rhythm, normal S1 and S2, no murmur/rub/gallop; No lower extremity edema; Peripheral pulses are 2+ bilaterally  ABDOMEN: Nontender to palpation, normoactive bowel sounds, no rebound/guarding; No hepatosplenomegaly  MUSCLOSKELETAL:  Normal gait; no clubbing or cyanosis of digits; no joint swelling or tenderness to palpation  PSYCH: A+O to person, place, and time; affect appropriate  NEUROLOGY: CN 2-12 are intact and symmetric; no gross sensory deficits;   SKIN: No rashes; no palpable lesions    LABS:    12-14    139  |  102  |  12  ----------------------------<  225<H>  4.0   |  21<L>  |  0.82    Ca    9.0      14 Dec 2019 06:35  Phos  3.8     12-14  Mg     1.7     12-14                  RADIOLOGY & ADDITIONAL TESTS:  Results Reviewed:   Imaging Personally Reviewed:  Electrocardiogram Personally Reviewed:    COORDINATION OF CARE:  Care Discussed with Consultants/Other Providers [Y/N]:  Prior or Outpatient Records Reviewed [Y/N]:

## 2019-12-15 NOTE — PROGRESS NOTE ADULT - PROBLEM SELECTOR PLAN 1
Sec to Alcoholic Intox vs new onset seizure-  acute onset of unresponsiveness on adm s/p icu intubation and extubation.  Alcoholic intoxication vs new onset seizures- Now of anti- epileptics - no seizures.  -ethanol level in blood 246, utox negative on admission   -s/p  banana bag, will cont MVT, folic acid and thiamine  -CT head negative   -per conversation needs CT angio, given concern for basilar artery stenosis as this may be a TIA   -Brain  MRI 12/10- neg, EEG neg  -s/p Keppra load, monitor off antiepileptics   - on ativan taper to complete today

## 2019-12-15 NOTE — PROGRESS NOTE ADULT - ASSESSMENT
Assessment  DMT2: 59y Male with DM T2 with hyperglycemia, was on oral meds at home, FS trending down, no new  hypoglycemic episode,  eating meals,  non compliant with low carb diet.  AMS: on medications, no chest pain, stable, monitored.  ETOH dependence: On treatment, awake, stable.  HTN: Controlled,  on antihypertensive medications.        Opal Neal MD  Cell: [phone]  Office: [phone]

## 2019-12-15 NOTE — PROGRESS NOTE ADULT - PROBLEM SELECTOR PLAN 3
fs with ss coverage  fs ovr 200.  hyperglycemia- hgb a1c 10.6 on admission.  - lantus 10 u qhs, 4 humalog TID- patient is alert and eating.

## 2019-12-15 NOTE — PROGRESS NOTE ADULT - PROBLEM SELECTOR PLAN 1
Will continue current insulin regimen for now. Will continue monitoring FS, log, will Follow up.  May get DC home on Janumet 50/1000mg PO BID. Stop Glimepiride, can cause hypoglycemia with inconsistent food intake situations.  Patient counseled for compliance with consistent low carb diet, exercise as tolerated as out patient.

## 2019-12-15 NOTE — PROGRESS NOTE ADULT - PROBLEM SELECTOR PLAN 2
Alcoholic intoxication vs new onset seizures  -ethanol level in blood 246, utox negative on admission   -s/p  banana bag, will cont MVT, folic acid and thiamine  -CT head negative   -per conversation needs CT angio, given concern for basilar artery stenosis as this may be a TIA   Brain  MRI 12/10- neg, EEG neg  -s/p Keppra load, monitor off antiepileptics for now  - on ativan taper to complete today

## 2019-12-15 NOTE — PROGRESS NOTE ADULT - SUBJECTIVE AND OBJECTIVE BOX
Chief complaint  Patient is a 59y old  Male who presents with a chief complaint of AMS (15 Dec 2019 09:14)   Review of systems  Patient in bed, looks comfortable, no fever, no hypoglycemia.    Labs and Fingersticks  CAPILLARY BLOOD GLUCOSE      POCT Blood Glucose.: 151 mg/dL (15 Dec 2019 12:16)  POCT Blood Glucose.: 201 mg/dL (15 Dec 2019 08:18)  POCT Blood Glucose.: 166 mg/dL (14 Dec 2019 22:20)  POCT Blood Glucose.: 153 mg/dL (14 Dec 2019 17:12)      Anion Gap, Serum: 16 <H> (12-14 @ 06:35)      Calcium, Total Serum: 9.0 (12-14 @ 06:35)          12-14    139  |  102  |  12  ----------------------------<  225<H>  4.0   |  21<L>  |  0.82    Ca    9.0      14 Dec 2019 06:35  Phos  3.8     12-14  Mg     1.7     12-14      Medications  MEDICATIONS  (STANDING):  atorvastatin 20 milliGRAM(s) Oral at bedtime  dextrose 5%. 1000 milliLiter(s) (50 mL/Hr) IV Continuous <Continuous>  dextrose 5%. 1000 milliLiter(s) (50 mL/Hr) IV Continuous <Continuous>  dextrose 50% Injectable 12.5 Gram(s) IV Push once  dextrose 50% Injectable 25 Gram(s) IV Push once  dextrose 50% Injectable 25 Gram(s) IV Push once  folic acid 1 milliGRAM(s) Oral daily  heparin  Injectable 5000 Unit(s) SubCutaneous every 8 hours  influenza   Vaccine 0.5 milliLiter(s) IntraMuscular once  insulin glargine Injectable (LANTUS) 10 Unit(s) SubCutaneous at bedtime  insulin lispro (HumaLOG) corrective regimen sliding scale   SubCutaneous three times a day before meals  insulin lispro (HumaLOG) corrective regimen sliding scale   SubCutaneous at bedtime  insulin lispro Injectable (HumaLOG) 4 Unit(s) SubCutaneous three times a day before meals  LORazepam   Injectable   IV Push   LORazepam   Injectable 1 milliGRAM(s) IV Push every 12 hours  multivitamin 1 Tablet(s) Oral daily  pantoprazole    Tablet 40 milliGRAM(s) Oral before breakfast      Physical Exam  General: Patient comfortable in bed  Vital Signs Last 12 Hrs  T(F): 97.2 (12-15-19 @ 12:37), Max: 98.6 (12-15-19 @ 10:00)  HR: 78 (12-15-19 @ 12:37) (70 - 88)  BP: 124/72 (12-15-19 @ 12:37) (116/78 - 124/72)  BP(mean): --  RR: 18 (12-15-19 @ 12:37) (18 - 18)  SpO2: 98% (12-15-19 @ 12:37) (98% - 99%)  Neck: No palpable thyroid nodules.  CVS: S1S2, No murmurs  Respiratory: No wheezing, no crepitations  GI: Abdomen soft, bowel sounds positive  Musculoskeletal:  edema lower extremities.   Skin: No skin rashes, no ecchymosis    Diagnostics

## 2019-12-16 ENCOUNTER — TRANSCRIPTION ENCOUNTER (OUTPATIENT)
Age: 59
End: 2019-12-16

## 2019-12-16 VITALS
HEART RATE: 70 BPM | RESPIRATION RATE: 17 BRPM | OXYGEN SATURATION: 99 % | DIASTOLIC BLOOD PRESSURE: 68 MMHG | TEMPERATURE: 98 F | SYSTOLIC BLOOD PRESSURE: 116 MMHG

## 2019-12-16 LAB
GLUCOSE BLDC GLUCOMTR-MCNC: 152 MG/DL — HIGH (ref 70–99)
GLUCOSE BLDC GLUCOMTR-MCNC: 198 MG/DL — HIGH (ref 70–99)

## 2019-12-16 PROCEDURE — 99239 HOSP IP/OBS DSCHRG MGMT >30: CPT

## 2019-12-16 RX ORDER — SITAGLIPTIN AND METFORMIN HYDROCHLORIDE 500; 50 MG/1; MG/1
1 TABLET, FILM COATED ORAL
Qty: 60 | Refills: 0
Start: 2019-12-16 | End: 2020-01-14

## 2019-12-16 RX ORDER — FOLIC ACID 0.8 MG
1 TABLET ORAL
Qty: 30 | Refills: 0
Start: 2019-12-16 | End: 2020-01-14

## 2019-12-16 RX ORDER — PANTOPRAZOLE SODIUM 20 MG/1
1 TABLET, DELAYED RELEASE ORAL
Qty: 30 | Refills: 0
Start: 2019-12-16 | End: 2020-01-14

## 2019-12-16 RX ADMIN — Medication 1: at 08:49

## 2019-12-16 RX ADMIN — Medication 0.5 MILLIGRAM(S): at 06:40

## 2019-12-16 RX ADMIN — BENZOCAINE AND MENTHOL 1 LOZENGE: 5; 1 LIQUID ORAL at 12:13

## 2019-12-16 RX ADMIN — Medication 1 TABLET(S): at 12:13

## 2019-12-16 RX ADMIN — Medication 1: at 12:27

## 2019-12-16 RX ADMIN — Medication 100 MILLIGRAM(S): at 12:13

## 2019-12-16 RX ADMIN — HEPARIN SODIUM 5000 UNIT(S): 5000 INJECTION INTRAVENOUS; SUBCUTANEOUS at 12:13

## 2019-12-16 RX ADMIN — Medication 1 MILLIGRAM(S): at 12:13

## 2019-12-16 RX ADMIN — Medication 4 UNIT(S): at 12:28

## 2019-12-16 RX ADMIN — PANTOPRAZOLE SODIUM 40 MILLIGRAM(S): 20 TABLET, DELAYED RELEASE ORAL at 06:40

## 2019-12-16 RX ADMIN — Medication 4 UNIT(S): at 08:48

## 2019-12-16 RX ADMIN — Medication 100 MILLIGRAM(S): at 01:29

## 2019-12-16 RX ADMIN — HEPARIN SODIUM 5000 UNIT(S): 5000 INJECTION INTRAVENOUS; SUBCUTANEOUS at 06:40

## 2019-12-16 NOTE — PROGRESS NOTE ADULT - SUBJECTIVE AND OBJECTIVE BOX
Patient is a 59y old  Male who presents with a chief complaint of AMS (16 Dec 2019 14:26)      SUBJECTIVE / OVERNIGHT EVENTS:  patient is Ox3 , getting dressed to go home- he will do out patient f/u with Endicrine, patient councelled not to drink.  He will f/u with PCP    MEDICATIONS  (STANDING):  atorvastatin 20 milliGRAM(s) Oral at bedtime  dextrose 5%. 1000 milliLiter(s) (50 mL/Hr) IV Continuous <Continuous>  dextrose 5%. 1000 milliLiter(s) (50 mL/Hr) IV Continuous <Continuous>  dextrose 50% Injectable 12.5 Gram(s) IV Push once  dextrose 50% Injectable 25 Gram(s) IV Push once  dextrose 50% Injectable 25 Gram(s) IV Push once  folic acid 1 milliGRAM(s) Oral daily  heparin  Injectable 5000 Unit(s) SubCutaneous every 8 hours  influenza   Vaccine 0.5 milliLiter(s) IntraMuscular once  insulin glargine Injectable (LANTUS) 10 Unit(s) SubCutaneous at bedtime  insulin lispro (HumaLOG) corrective regimen sliding scale   SubCutaneous three times a day before meals  insulin lispro (HumaLOG) corrective regimen sliding scale   SubCutaneous at bedtime  insulin lispro Injectable (HumaLOG) 4 Unit(s) SubCutaneous three times a day before meals  LORazepam   Injectable   IV Push   LORazepam   Injectable 0.5 milliGRAM(s) IV Push every 12 hours  multivitamin 1 Tablet(s) Oral daily  pantoprazole    Tablet 40 milliGRAM(s) Oral before breakfast  thiamine 100 milliGRAM(s) Oral daily    MEDICATIONS  (PRN):  benzocaine 15 mG/menthol 3.6 mG (Sugar-Free) Lozenge 1 Lozenge Oral every 4 hours PRN Sore Throat  dextrose 40% Gel 15 Gram(s) Oral once PRN Blood Glucose LESS THAN 70 milliGRAM(s)/deciliter  glucagon  Injectable 1 milliGRAM(s) IntraMuscular once PRN Glucose LESS THAN 70 milligrams/deciliter      POCT Blood Glucose.: 198 mg/dL (16 Dec 2019 12:11)  POCT Blood Glucose.: 152 mg/dL (16 Dec 2019 08:44)  POCT Blood Glucose.: 174 mg/dL (15 Dec 2019 22:13)  POCT Blood Glucose.: 120 mg/dL (15 Dec 2019 17:31)    Vital Signs Last 24 Hrs  T(C): 36.9 (16 Dec 2019 12:43), Max: 36.9 (16 Dec 2019 01:30)  T(F): 98.4 (16 Dec 2019 12:43), Max: 98.4 (16 Dec 2019 01:30)  HR: 70 (16 Dec 2019 12:43) (70 - 89)  BP: 116/68 (16 Dec 2019 12:43) (112/70 - 127/73)  BP(mean): --  RR: 17 (16 Dec 2019 12:43) (16 - 18)  SpO2: 99% (16 Dec 2019 12:43) (96% - 100%)      PHYSICAL EXAM:  GENERAL: NAD, well-developed  HEAD:  Atraumatic, Normocephalic  EYES: EOMI, PERRLA, conjunctiva and sclera clear  NECK: Supple, No JVD  CHEST/LUNG: Clear to auscultation bilaterally; No wheeze  HEART: Regular rate and rhythm; No murmurs, rubs, or gallops  ABDOMEN: Soft, Nontender, Nondistended; Bowel sounds present  EXTREMITIES:  2+ Peripheral Pulses, No clubbing, cyanosis, or edema  PSYCH: AAOx3  NEUROLOGY: non-focal  SKIN: No rashes or lesions    RADIOLOGY & ADDITIONAL TESTS:  < from: CT Angio Head w/ IV Cont (12.11.19 @ 09:46) >  IMPRESSION:    CTA HEAD:  There is no evidence for focal stenosis, major vessel occlusion, or  Aneurysm about the [place].  Head CT:  No acute intracranial abnormality is noted.  CTA head and neck:  No evidence for significant intracranial or intracranial arterial   stenosis.    < from: MR Head w/wo IV Cont (12.10.19 @ 11:12) >  IMPRESSION:    Unremarkable MR of the brain.          Care Discussed with Consultants/Other Providers:

## 2019-12-16 NOTE — PROGRESS NOTE ADULT - PROBLEM SELECTOR PLAN 3
fs with ss coverage  fs ovr 200.  hyperglycemia- hgb a1c 10.6 on admission.  - Out patient f/u with endocrine

## 2019-12-16 NOTE — PROGRESS NOTE ADULT - PROBLEM SELECTOR PLAN 1
Sec to Alcoholic Intox vs new onset seizure-  acute onset of unresponsiveness on adm s/p icu intubation and extubation.  Alcoholic intoxication vs new onset seizures- Now of anti- epileptics - no seizures.  -ethanol level in blood 246, utox negative on admission   -s/p  banana bag, will cont MVT, folic acid and thiamine  -CT head negative   -per conversation needs CT angio, given concern for basilar artery stenosis as this may be a TIA . mRI- neg  RESOLVED Ox3 today  -Brain  MRI 12/10- neg, EEG neg  -s/p Keppra load, monitor off antiepileptics   - on ativan taper to complete today

## 2019-12-16 NOTE — PROGRESS NOTE ADULT - NSHPATTENDINGPLANDISCUSS_GEN_ALL_CORE
resident/fellow/nurse
resident/fellow/nurse
patient and neurology and MICU teams.
patient / np/ endo/ pcp by Np
patient/ sister/NP/RN

## 2019-12-16 NOTE — PROGRESS NOTE ADULT - ATTENDING COMMENTS
patient is Ox3 , getting dressed to go home- he will do out patient f/u with Endocrine, patient counselled not to drink.  He will f/u with PCP and Endocrine.  Patient councelled to STOP drinking    40 min to coordinate d/c patient is Ox3 , getting dressed to go home- he will do out patient f/u with Endocrine, patient counselled not to drink.  He will f/u with PCP and Endocrine.  Patient counselled to STOP drinking    Spoke with Mabel Avina Phone: [phone] about pt's hospital course, CT, EEG and labs findings, follow up appt made on 12/18/2019 at 11: 30 AM.   [address] [place] [address]. Pt informed about his appt        40 min to coordinate d/c

## 2019-12-16 NOTE — PROGRESS NOTE ADULT - ASSESSMENT
Assessment  DMT2: 59y Male with DM T2 with hyperglycemia, was on oral meds at home, blood sugars improved, no new  hypoglycemic episode,  eating meals,  non compliant with low carb diet, planing DC home today.  AMS: on medications, no chest pain, stable, monitored.  ETOH dependence: On treatment, awake, stable.  HTN: Controlled,  on antihypertensive medications.        Opal Neal MD  Cell: [phone]  Office: [phone]

## 2019-12-16 NOTE — PROGRESS NOTE ADULT - ASSESSMENT
59Y M with PMH DM, HTN, HLD who presents with acute onset of unresponsiveness. CT Head negative for acute pathology, likely has Status epilepticus likely secondary to alcohol intoxication.  ICU STAY with Intubation and extubation and transferred to floor 12/11/19  [place]  cognitive impairment - Ox2- ? sec to Wernicke's encephalopathy ??- f/u vit B1 level- start vit B1 iv x 3days  S/s 12/11- soft solid- thin liquids  Neuro- to f/u  R upper Ext dopler- R/o DVT- doppler ord- IVL  Hypokalemia- supplemented- resolved  RESOLVED ENCEPHALOPATHY- most likely sec   to ETOH  on admission- treated also with iv b1

## 2019-12-16 NOTE — PROGRESS NOTE ADULT - PROBLEM SELECTOR PLAN 2
Alcoholic intoxication vs new onset seizures  -ethanol level in blood 246, utox negative on admission   -s/p  banana bag, will cont MVT, folic acid and thiamine  -CT head negative , mri of head neg-  RESOLVED [place]- ox3 today  -per conversation needs CT angio, given concern for basilar artery stenosis as this may be a TIA   Brain  MRI 12/10- neg, EEG neg  -s/p Keppra load, monitor off antiepileptics for now  - on ativan taper to complete today

## 2019-12-16 NOTE — DISCHARGE NOTE NURSING/CASE MANAGEMENT/SOCIAL WORK - PATIENT PORTAL LINK FT
You can access the FollowMyHealth Patient Portal offered by [facility] by registering at the following website: [email or web address]. By joining [employer]’s FollowMyHealth portal, you will also be able to view your health information using other applications (apps) compatible with our system.

## 2019-12-16 NOTE — PROGRESS NOTE ADULT - SUBJECTIVE AND OBJECTIVE BOX
Chief complaint  Patient is a 59y old  Male who presents with a chief complaint of AMS (15 Dec 2019 17:07)   Review of systems  Patient in bed, looks comfortable, no fever, no hypoglycemia.    Labs and Fingersticks  CAPILLARY BLOOD GLUCOSE      POCT Blood Glucose.: 198 mg/dL (16 Dec 2019 12:11)  POCT Blood Glucose.: 152 mg/dL (16 Dec 2019 08:44)  POCT Blood Glucose.: 174 mg/dL (15 Dec 2019 22:13)  POCT Blood Glucose.: 120 mg/dL (15 Dec 2019 17:31)    Medications  MEDICATIONS  (STANDING):  atorvastatin 20 milliGRAM(s) Oral at bedtime  dextrose 5%. 1000 milliLiter(s) (50 mL/Hr) IV Continuous <Continuous>  dextrose 5%. 1000 milliLiter(s) (50 mL/Hr) IV Continuous <Continuous>  dextrose 50% Injectable 12.5 Gram(s) IV Push once  dextrose 50% Injectable 25 Gram(s) IV Push once  dextrose 50% Injectable 25 Gram(s) IV Push once  folic acid 1 milliGRAM(s) Oral daily  heparin  Injectable 5000 Unit(s) SubCutaneous every 8 hours  influenza   Vaccine 0.5 milliLiter(s) IntraMuscular once  insulin glargine Injectable (LANTUS) 10 Unit(s) SubCutaneous at bedtime  insulin lispro (HumaLOG) corrective regimen sliding scale   SubCutaneous three times a day before meals  insulin lispro (HumaLOG) corrective regimen sliding scale   SubCutaneous at bedtime  insulin lispro Injectable (HumaLOG) 4 Unit(s) SubCutaneous three times a day before meals  LORazepam   Injectable   IV Push   LORazepam   Injectable 0.5 milliGRAM(s) IV Push every 12 hours  multivitamin 1 Tablet(s) Oral daily  pantoprazole    Tablet 40 milliGRAM(s) Oral before breakfast  thiamine 100 milliGRAM(s) Oral daily      Physical Exam  General: Patient comfortable in bed  Vital Signs Last 12 Hrs  T(F): 98.4 (12-16-19 @ 12:43), Max: 98.4 (12-16-19 @ 12:43)  HR: 70 (12-16-19 @ 12:43) (70 - 86)  BP: 116/68 (12-16-19 @ 12:43) (112/70 - 127/73)  BP(mean): --  RR: 17 (12-16-19 @ 12:43) (16 - 18)  SpO2: 99% (12-16-19 @ 12:43) (98% - 99%)  Neck: No palpable thyroid nodules.  CVS: S1S2, No murmurs  Respiratory: No wheezing, no crepitations  GI: Abdomen soft, bowel sounds positive  Musculoskeletal:  edema lower extremities.   Skin: No skin rashes, no ecchymosis    Diagnostics

## 2019-12-16 NOTE — CHART NOTE - NSCHARTNOTEFT_GEN_A_CORE
Spoke with Mabel Avina Phone: [phone] about pt's hospital course, CT, EEG and labs findings, follow up appt made on 12/18/2019 at 11: 30 AM.   [address] [place] [address]. Pt informed about his appt      [place] [phone]

## 2019-12-18 LAB — PYRIDOXAL PHOS SERPL-MCNC: 8.9 UG/L — SIGNIFICANT CHANGE UP (ref 5.3–46.7)

## 2019-12-19 LAB — VIT B1 SERPL-MCNC: 306.6 NMOL/L — HIGH (ref 66.5–200)

## 2019-12-20 LAB — METHYLMALONATE SERPL-SCNC: <50 NMOL/L — SIGNIFICANT CHANGE UP (ref 0–378)

## 2020-04-23 NOTE — SWALLOW BEDSIDE ASSESSMENT ADULT - SWALLOW EVAL: ORAL MUSCULATURE
Patient: Melecio Manning    Procedure(s):  Dental Exam, Radiographs, Dental Restorations  x10  Periodontal Therapy, Urgent Dental Extractions x1 , fluoride varnish in mouth    Diagnosis: Dental caries [K02.9]  Diagnosis Additional Information: No value filed.    Anesthesia Type:   General     Note:  Airway :Nasal Cannula  Patient transferred to:PACU  Comments: Extubated in OR 11, transferred to PACU with oxygen, hemodynamically stable. Upon arrival to PACU, pain is nil, no nausea. SpO2 98% on 6L O2 via nasal cannula.     Leeanna Key MDHandoff Report: Identifed the Patient, Identified the Reponsible Provider, Reviewed the pertinent medical history, Discussed the surgical course, Reviewed Intra-OP anesthesia mangement and issues during anesthesia, Set expectations for post-procedure period and Allowed opportunity for questions and acknowledgement of understanding      Vitals: (Last set prior to Anesthesia Care Transfer)    CRNA VITALS  4/23/2020 0950 - 4/23/2020 1050      4/23/2020             Resp Rate (observed):  (!) 1                Electronically Signed By: Leeanna Key MD  April 23, 2020  10:55 AM  
generally intact

## 2020-11-02 ENCOUNTER — EMERGENCY (EMERGENCY)
Facility: HOSPITAL | Age: 60
LOS: 1 days | Discharge: HOME | End: 2020-11-02
Attending: EMERGENCY MEDICINE | Admitting: EMERGENCY MEDICINE
Payer: MEDICAID

## 2020-11-02 VITALS
DIASTOLIC BLOOD PRESSURE: 101 MMHG | SYSTOLIC BLOOD PRESSURE: 154 MMHG | OXYGEN SATURATION: 100 % | HEIGHT: 62 IN | RESPIRATION RATE: 18 BRPM | TEMPERATURE: 99 F | HEART RATE: 123 BPM

## 2020-11-02 VITALS — HEART RATE: 114 BPM

## 2020-11-02 PROCEDURE — 10060 I&D ABSCESS SIMPLE/SINGLE: CPT

## 2020-11-02 PROCEDURE — 99283 EMERGENCY DEPT VISIT LOW MDM: CPT | Mod: 25

## 2020-11-02 RX ORDER — CEPHALEXIN 500 MG
1 CAPSULE ORAL
Qty: 14 | Refills: 0
Start: 2020-11-02 | End: 2020-11-08

## 2020-11-02 NOTE — ED PROVIDER NOTE - PATIENT PORTAL LINK FT
You can access the FollowMyHealth Patient Portal offered by [facility] by registering at the following website: [email or web address]. By joining [employer]’s FollowMyHealth portal, you will also be able to view your health information using other applications (apps) compatible with our system.

## 2020-11-02 NOTE — ED PROVIDER NOTE - OBJECTIVE STATEMENT
61 y/o male hx HTN DM presents to ER c/o abscess to groin/buttocks x 3 days. Pt. states he felt small bump  to right inner duarte/buttocks region which has grown in size and has become more painful. states has had similar boils in the past which have needed drainage. Pt. denies fever chills weakness dizziness bleeding or drainage.

## 2020-11-02 NOTE — ED ADULT TRIAGE NOTE - CHIEF COMPLAINT QUOTE
c/o left buttock abscess for 1 week, denies taking Abx, denies fever, chills, hx of abscess in the past, DM and HTN.

## 2020-11-02 NOTE — ED PROVIDER NOTE - ATTENDING CONTRIBUTION TO CARE
agree with PA note    "59 y/o male hx HTN DM presents to ER c/o abscess to groin/buttocks x 3 days. Pt. states he felt small bump  to right inner duarte/buttocks region which has grown in size and has become more painful. "    PE: non toxic; VSS: CTAB/L; s1 s2 no m/r/g skin: abscess to right inner groin;    Imp: abscess; I+D; dc on abx

## 2020-11-02 NOTE — ED PROVIDER NOTE - NSFOLLOWUPINSTRUCTIONS_ED_ALL_ED_FT
REST, NO STRENUOUS ACTIVITY  KEEP AREA CLEAN AND DRY FOR THE NEXT 48 HOURS  AFTER  - MAY WASH WITH SOAP AND WATER DAILY  **FOLLOW UP WITH REGULAR DOCTOR IN 3-5 DAYS**  RETURN TO ER FOR WORSENING SYMPTOMS

## 2020-11-02 NOTE — ED PROVIDER NOTE - PHYSICAL EXAMINATION
Gen: Well appearing in NAD  Head: NC/AT  Neck: trachea midline  Resp:  No distress  Ext: no deformities  Neuro:  A&O appears non focal  Skin:  + superficial abscess to right groin/perineal area, no active drainage.   Psych:  Normal affect and mood

## 2021-10-06 PROBLEM — I10 ESSENTIAL HYPERTENSION: Status: ACTIVE | Noted: 2017-07-07

## 2022-08-05 NOTE — PROGRESS NOTE ADULT - ASSESSMENT
59Y M with PMH DM, HTN, HLD who presents with acute onset of unresponsiveness. CT Head negative for acute pathology, likely has Status epilepticus likely secondary to alcohol intoxication    #Neuro  Alcoholic intoxication vs new onset seizures  -ethanol level in blood 246, utox negative   -start banana bag   -taper propofol and get 24 hour EEG for seizures  -f/u neuro recs  -CT head negative   - F/u MRI brain to r/o structural abnormality    #Resp  Intubated for airway protection  -f/u post extubation ABG  -afebrile, without a white count  -f/u Xray    #CV  HTN  -on home metoprolol (med rec to be done in the AM)  -MAP <65 when propofol at 40, MAP> 65 with lower propofol levels.   -weaning propfol as tolerated    #GI  -NPO for now  -protonix 40 IV BID    #ID  -afebrile and WBC 8.42  -UA negative for leuk esterase and nitrates  -f/u CXR  -no need for abx now  - low threshold to start abx     #Renal  -Creatinine 0.77 (no baseline)  -Has rossi because patient was incontinent during the episode  -monitor Is and Os    #Heme  -Hemoglobin 13.1, stable  -continue to monitor  -f/u FOBT    #Endo  DM  -only on metformin  -HbA1c in AM    #DVT PPx  -Heparin 5000 TID 59Y M with PMH DM, HTN, HLD who presents with acute onset of unresponsiveness. CT Head negative for acute pathology, likely has Status epilepticus likely secondary to alcohol intoxication    #Neuro  Alcoholic intoxication vs new onset seizures  -ethanol level in blood 246, utox negative on admission   -ss/p  banana bag, will cont MVT, folic acid and thiamine  -will obtain EEG  -CT head negative, will hold of on CT angio for now  -Pending MRI brain to r/o structural abnormality after EEG  - s/p Keppra load  -will order phenobarb prn for alcohol withdrawal    #Resp  Intubated for airway protection  -Will do CPAP trial  -afebrile, without a white count, hold off on abx      #CV  HTN  -on home metoprolol (med rec to be done in the AM)  -on levophed  -weaning propfol as tolerated    #GI  -NPO for now  -will change protonix 40 IV q D  - concern for ? brown fluid on OGT     #ID  -afebrile and WBC 8.42  -UA negative for leuk esterase and nitrates  -f/u CXR  -no need for abx now  - low threshold to start abx     #Renal  -Creatinine 0.77 (no baseline)  -Has rossi because patient was incontinent during the episode  -monitor Is and Os    #Heme  -Hemoglobin 13.1, stable  -continue to monitor  -f/u FOBT    #Endo  DM  -iSS and FS for now  -Hg A1c 10.6, will cont to monitor, likely need insulin      #DVT PPx  -Heparin 5000 TID No

## 2023-06-08 NOTE — PROGRESS NOTE ADULT - PROBLEM SELECTOR PROBLEM 6
08-Jun-2023 19:12
Prophylactic measure

## 2024-05-01 NOTE — PROGRESS NOTE ADULT - PROBLEM SELECTOR PROBLEM 1
Please call any [facility] lab of your choosing to schedule lab work - this needs to be done between 7:30-8:30 AM on an empty stomach.    Diabetes mellitus

## 2024-11-05 NOTE — PROGRESS NOTE ADULT - PROBLEM SELECTOR PLAN 5
takes enalapril 10mg at home per outpt pharmacy  -holding off antihypertensives  - monitor BPs and restart BP med as tolerated  -obtain official med rec, as rec obtained while pt intubated Additional Notes: Pt given samples: Cetaphil, clean and clear. \\nDiscussed with pt to consider (BIOLOGIC) at f/u if pt is not improving. Render Risk Assessment In Note?: no Detail Level: Detailed Additional Notes: Recommended not removing until pt is older to avoid potential scarring, consider laser around age 22